# Patient Record
Sex: MALE | Race: WHITE | NOT HISPANIC OR LATINO | Employment: UNEMPLOYED | URBAN - METROPOLITAN AREA
[De-identification: names, ages, dates, MRNs, and addresses within clinical notes are randomized per-mention and may not be internally consistent; named-entity substitution may affect disease eponyms.]

---

## 2021-09-28 ENCOUNTER — HOSPITAL ENCOUNTER (INPATIENT)
Facility: MEDICAL CENTER | Age: 31
LOS: 10 days | DRG: 199 | End: 2021-10-08
Attending: EMERGENCY MEDICINE | Admitting: EMERGENCY MEDICINE
Payer: COMMERCIAL

## 2021-09-28 ENCOUNTER — APPOINTMENT (OUTPATIENT)
Dept: RADIOLOGY | Facility: MEDICAL CENTER | Age: 31
DRG: 199 | End: 2021-09-28
Attending: INTERNAL MEDICINE
Payer: COMMERCIAL

## 2021-09-28 DIAGNOSIS — J96.21 ACUTE ON CHRONIC RESPIRATORY FAILURE WITH HYPOXIA (HCC): ICD-10-CM

## 2021-09-28 DIAGNOSIS — I82.401 ACUTE DEEP VEIN THROMBOSIS (DVT) OF RIGHT LOWER EXTREMITY, UNSPECIFIED VEIN (HCC): ICD-10-CM

## 2021-09-28 PROBLEM — D75.839 THROMBOCYTOSIS: Status: ACTIVE | Noted: 2021-09-28

## 2021-09-28 PROBLEM — E11.9 TYPE 2 DIABETES MELLITUS WITHOUT COMPLICATION, WITHOUT LONG-TERM CURRENT USE OF INSULIN (HCC): Status: ACTIVE | Noted: 2021-09-28

## 2021-09-28 PROBLEM — J96.01 ACUTE RESPIRATORY FAILURE WITH HYPOXIA (HCC): Status: ACTIVE | Noted: 2021-09-28

## 2021-09-28 PROBLEM — J98.4 PNEUMATOCELE OF LUNG: Status: ACTIVE | Noted: 2021-09-28

## 2021-09-28 PROBLEM — J12.82 PNEUMONIA DUE TO COVID-19 VIRUS: Status: ACTIVE | Noted: 2021-09-28

## 2021-09-28 PROBLEM — U07.1 PNEUMONIA DUE TO COVID-19 VIRUS: Status: ACTIVE | Noted: 2021-09-28

## 2021-09-28 PROBLEM — J93.9 BILATERAL PNEUMOTHORACES: Status: ACTIVE | Noted: 2021-09-28

## 2021-09-28 PROCEDURE — 93005 ELECTROCARDIOGRAM TRACING: CPT | Performed by: INTERNAL MEDICINE

## 2021-09-28 PROCEDURE — 700111 HCHG RX REV CODE 636 W/ 250 OVERRIDE (IP): Performed by: INTERNAL MEDICINE

## 2021-09-28 PROCEDURE — A9270 NON-COVERED ITEM OR SERVICE: HCPCS | Performed by: INTERNAL MEDICINE

## 2021-09-28 PROCEDURE — 80048 BASIC METABOLIC PNL TOTAL CA: CPT

## 2021-09-28 PROCEDURE — 87641 MR-STAPH DNA AMP PROBE: CPT

## 2021-09-28 PROCEDURE — 99291 CRITICAL CARE FIRST HOUR: CPT | Performed by: INTERNAL MEDICINE

## 2021-09-28 PROCEDURE — 700102 HCHG RX REV CODE 250 W/ 637 OVERRIDE(OP): Performed by: INTERNAL MEDICINE

## 2021-09-28 PROCEDURE — 84145 PROCALCITONIN (PCT): CPT

## 2021-09-28 PROCEDURE — 770022 HCHG ROOM/CARE - ICU (200)

## 2021-09-28 PROCEDURE — 82962 GLUCOSE BLOOD TEST: CPT

## 2021-09-28 PROCEDURE — 700105 HCHG RX REV CODE 258: Performed by: INTERNAL MEDICINE

## 2021-09-28 PROCEDURE — 71045 X-RAY EXAM CHEST 1 VIEW: CPT

## 2021-09-28 RX ORDER — ENALAPRILAT 1.25 MG/ML
1.25 INJECTION INTRAVENOUS EVERY 6 HOURS PRN
Status: DISCONTINUED | OUTPATIENT
Start: 2021-09-28 | End: 2021-09-29

## 2021-09-28 RX ORDER — BISACODYL 10 MG
10 SUPPOSITORY, RECTAL RECTAL
Status: DISCONTINUED | OUTPATIENT
Start: 2021-09-28 | End: 2021-10-08 | Stop reason: HOSPADM

## 2021-09-28 RX ORDER — OXYCODONE HYDROCHLORIDE 5 MG/1
5 TABLET ORAL
Status: DISCONTINUED | OUTPATIENT
Start: 2021-09-28 | End: 2021-09-28

## 2021-09-28 RX ORDER — PROMETHAZINE HYDROCHLORIDE 25 MG/1
12.5-25 TABLET ORAL EVERY 4 HOURS PRN
Status: DISCONTINUED | OUTPATIENT
Start: 2021-09-28 | End: 2021-10-08 | Stop reason: HOSPADM

## 2021-09-28 RX ORDER — AMOXICILLIN 250 MG
2 CAPSULE ORAL 2 TIMES DAILY
Status: DISCONTINUED | OUTPATIENT
Start: 2021-09-28 | End: 2021-10-08 | Stop reason: HOSPADM

## 2021-09-28 RX ORDER — SODIUM CHLORIDE 9 MG/ML
INJECTION, SOLUTION INTRAVENOUS CONTINUOUS
Status: DISCONTINUED | OUTPATIENT
Start: 2021-09-28 | End: 2021-09-29

## 2021-09-28 RX ORDER — PROMETHAZINE HYDROCHLORIDE 25 MG/1
12.5-25 SUPPOSITORY RECTAL EVERY 4 HOURS PRN
Status: DISCONTINUED | OUTPATIENT
Start: 2021-09-28 | End: 2021-10-08 | Stop reason: HOSPADM

## 2021-09-28 RX ORDER — OXYCODONE HYDROCHLORIDE AND ACETAMINOPHEN 5; 325 MG/1; MG/1
1-2 TABLET ORAL EVERY 4 HOURS PRN
Status: DISCONTINUED | OUTPATIENT
Start: 2021-09-28 | End: 2021-09-29

## 2021-09-28 RX ORDER — POLYETHYLENE GLYCOL 3350 17 G/17G
1 POWDER, FOR SOLUTION ORAL
Status: DISCONTINUED | OUTPATIENT
Start: 2021-09-28 | End: 2021-10-08 | Stop reason: HOSPADM

## 2021-09-28 RX ORDER — ACETAMINOPHEN 325 MG/1
650 TABLET ORAL EVERY 6 HOURS PRN
Status: DISCONTINUED | OUTPATIENT
Start: 2021-09-28 | End: 2021-10-08 | Stop reason: HOSPADM

## 2021-09-28 RX ORDER — SODIUM CHLORIDE, SODIUM LACTATE, POTASSIUM CHLORIDE, AND CALCIUM CHLORIDE .6; .31; .03; .02 G/100ML; G/100ML; G/100ML; G/100ML
500 INJECTION, SOLUTION INTRAVENOUS ONCE
Status: COMPLETED | OUTPATIENT
Start: 2021-09-29 | End: 2021-09-29

## 2021-09-28 RX ORDER — HYDROMORPHONE HYDROCHLORIDE 1 MG/ML
1 INJECTION, SOLUTION INTRAMUSCULAR; INTRAVENOUS; SUBCUTANEOUS ONCE
Status: COMPLETED | OUTPATIENT
Start: 2021-09-28 | End: 2021-09-28

## 2021-09-28 RX ORDER — OXYCODONE HYDROCHLORIDE 5 MG/1
2.5 TABLET ORAL
Status: DISCONTINUED | OUTPATIENT
Start: 2021-09-28 | End: 2021-09-29

## 2021-09-28 RX ORDER — PROCHLORPERAZINE EDISYLATE 5 MG/ML
5-10 INJECTION INTRAMUSCULAR; INTRAVENOUS EVERY 4 HOURS PRN
Status: DISCONTINUED | OUTPATIENT
Start: 2021-09-28 | End: 2021-10-08 | Stop reason: HOSPADM

## 2021-09-28 RX ORDER — DEXTROSE MONOHYDRATE 25 G/50ML
50 INJECTION, SOLUTION INTRAVENOUS
Status: DISCONTINUED | OUTPATIENT
Start: 2021-09-28 | End: 2021-10-07

## 2021-09-28 RX ORDER — CLONIDINE HYDROCHLORIDE 0.1 MG/1
0.1 TABLET ORAL EVERY 6 HOURS PRN
Status: DISCONTINUED | OUTPATIENT
Start: 2021-09-28 | End: 2021-09-29

## 2021-09-28 RX ORDER — MORPHINE SULFATE 4 MG/ML
2 INJECTION, SOLUTION INTRAMUSCULAR; INTRAVENOUS
Status: DISCONTINUED | OUTPATIENT
Start: 2021-09-28 | End: 2021-09-29

## 2021-09-28 RX ORDER — ONDANSETRON 4 MG/1
4 TABLET, ORALLY DISINTEGRATING ORAL EVERY 4 HOURS PRN
Status: DISCONTINUED | OUTPATIENT
Start: 2021-09-28 | End: 2021-10-08 | Stop reason: HOSPADM

## 2021-09-28 RX ORDER — LABETALOL HYDROCHLORIDE 5 MG/ML
10 INJECTION, SOLUTION INTRAVENOUS EVERY 4 HOURS PRN
Status: DISCONTINUED | OUTPATIENT
Start: 2021-09-28 | End: 2021-09-29

## 2021-09-28 RX ORDER — ONDANSETRON 2 MG/ML
4 INJECTION INTRAMUSCULAR; INTRAVENOUS EVERY 4 HOURS PRN
Status: DISCONTINUED | OUTPATIENT
Start: 2021-09-28 | End: 2021-10-08 | Stop reason: HOSPADM

## 2021-09-28 RX ADMIN — INSULIN HUMAN 2 UNITS: 100 INJECTION, SOLUTION PARENTERAL at 23:34

## 2021-09-28 RX ADMIN — SODIUM CHLORIDE: 9 INJECTION, SOLUTION INTRAVENOUS at 23:57

## 2021-09-28 RX ADMIN — CEFEPIME 2 G: 2 INJECTION, POWDER, FOR SOLUTION INTRAVENOUS at 23:59

## 2021-09-28 RX ADMIN — OXYCODONE 5 MG: 5 TABLET ORAL at 23:03

## 2021-09-28 RX ADMIN — HYDROMORPHONE HYDROCHLORIDE 1 MG: 1 INJECTION, SOLUTION INTRAMUSCULAR; INTRAVENOUS; SUBCUTANEOUS at 22:00

## 2021-09-28 ASSESSMENT — ENCOUNTER SYMPTOMS
ABDOMINAL PAIN: 0
BRUISES/BLEEDS EASILY: 0
SPEECH CHANGE: 0
FEVER: 0
MYALGIAS: 0
VOMITING: 0
DIZZINESS: 0
HEADACHES: 0
DEPRESSION: 0
DIAPHORESIS: 1
HEMOPTYSIS: 1
SPUTUM PRODUCTION: 1
BACK PAIN: 0
PALPITATIONS: 1
SORE THROAT: 0
NAUSEA: 0
NERVOUS/ANXIOUS: 1
SENSORY CHANGE: 0
CHILLS: 0
SHORTNESS OF BREATH: 1
COUGH: 1
WHEEZING: 0
BLURRED VISION: 0

## 2021-09-28 ASSESSMENT — PAIN DESCRIPTION - PAIN TYPE
TYPE: SURGICAL PAIN
TYPE: SURGICAL PAIN

## 2021-09-29 ENCOUNTER — HOSPITAL ENCOUNTER (OUTPATIENT)
Dept: RADIOLOGY | Facility: MEDICAL CENTER | Age: 31
End: 2021-09-29

## 2021-09-29 ENCOUNTER — APPOINTMENT (OUTPATIENT)
Dept: RADIOLOGY | Facility: MEDICAL CENTER | Age: 31
DRG: 199 | End: 2021-09-29
Attending: INTERNAL MEDICINE
Payer: COMMERCIAL

## 2021-09-29 ENCOUNTER — APPOINTMENT (OUTPATIENT)
Dept: RADIOLOGY | Facility: MEDICAL CENTER | Age: 31
DRG: 199 | End: 2021-09-29
Attending: STUDENT IN AN ORGANIZED HEALTH CARE EDUCATION/TRAINING PROGRAM
Payer: COMMERCIAL

## 2021-09-29 LAB
ANION GAP SERPL CALC-SCNC: 11 MMOL/L (ref 7–16)
ANION GAP SERPL CALC-SCNC: 15 MMOL/L (ref 7–16)
BASOPHILS # BLD AUTO: 0.1 % (ref 0–1.8)
BASOPHILS # BLD: 0.03 K/UL (ref 0–0.12)
BUN SERPL-MCNC: 21 MG/DL (ref 8–22)
BUN SERPL-MCNC: 22 MG/DL (ref 8–22)
CALCIUM SERPL-MCNC: 8.8 MG/DL (ref 8.5–10.5)
CALCIUM SERPL-MCNC: 8.8 MG/DL (ref 8.5–10.5)
CHLORIDE SERPL-SCNC: 97 MMOL/L (ref 96–112)
CHLORIDE SERPL-SCNC: 97 MMOL/L (ref 96–112)
CO2 SERPL-SCNC: 21 MMOL/L (ref 20–33)
CO2 SERPL-SCNC: 24 MMOL/L (ref 20–33)
CREAT SERPL-MCNC: 0.82 MG/DL (ref 0.5–1.4)
CREAT SERPL-MCNC: 1.09 MG/DL (ref 0.5–1.4)
EKG IMPRESSION: NORMAL
EKG IMPRESSION: NORMAL
EOSINOPHIL # BLD AUTO: 0.01 K/UL (ref 0–0.51)
EOSINOPHIL NFR BLD: 0 % (ref 0–6.9)
ERYTHROCYTE [DISTWIDTH] IN BLOOD BY AUTOMATED COUNT: 46.9 FL (ref 35.9–50)
GLUCOSE BLD-MCNC: 142 MG/DL (ref 65–99)
GLUCOSE BLD-MCNC: 153 MG/DL (ref 65–99)
GLUCOSE BLD-MCNC: 166 MG/DL (ref 65–99)
GLUCOSE BLD-MCNC: 168 MG/DL (ref 65–99)
GLUCOSE SERPL-MCNC: 176 MG/DL (ref 65–99)
GLUCOSE SERPL-MCNC: 192 MG/DL (ref 65–99)
HCT VFR BLD AUTO: 34.5 % (ref 42–52)
HGB BLD-MCNC: 11.1 G/DL (ref 14–18)
IMM GRANULOCYTES # BLD AUTO: 0.33 K/UL (ref 0–0.11)
IMM GRANULOCYTES NFR BLD AUTO: 1.4 % (ref 0–0.9)
LYMPHOCYTES # BLD AUTO: 1.81 K/UL (ref 1–4.8)
LYMPHOCYTES NFR BLD: 7.9 % (ref 22–41)
MAGNESIUM SERPL-MCNC: 1.9 MG/DL (ref 1.5–2.5)
MCH RBC QN AUTO: 29.3 PG (ref 27–33)
MCHC RBC AUTO-ENTMCNC: 32.2 G/DL (ref 33.7–35.3)
MCV RBC AUTO: 91 FL (ref 81.4–97.8)
MONOCYTES # BLD AUTO: 1.99 K/UL (ref 0–0.85)
MONOCYTES NFR BLD AUTO: 8.7 % (ref 0–13.4)
MRSA DNA SPEC QL NAA+PROBE: NORMAL
NEUTROPHILS # BLD AUTO: 18.76 K/UL (ref 1.82–7.42)
NEUTROPHILS NFR BLD: 81.9 % (ref 44–72)
NRBC # BLD AUTO: 0 K/UL
NRBC BLD-RTO: 0 /100 WBC
PHOSPHATE SERPL-MCNC: 4 MG/DL (ref 2.5–4.5)
PLATELET # BLD AUTO: 480 K/UL (ref 164–446)
PMV BLD AUTO: 9.2 FL (ref 9–12.9)
POTASSIUM SERPL-SCNC: 4.9 MMOL/L (ref 3.6–5.5)
POTASSIUM SERPL-SCNC: 5.1 MMOL/L (ref 3.6–5.5)
PROCALCITONIN SERPL-MCNC: 1.4 NG/ML
RBC # BLD AUTO: 3.79 M/UL (ref 4.7–6.1)
SIGNIFICANT IND 70042: NORMAL
SITE SITE: NORMAL
SODIUM SERPL-SCNC: 132 MMOL/L (ref 135–145)
SODIUM SERPL-SCNC: 133 MMOL/L (ref 135–145)
SOURCE SOURCE: NORMAL
WBC # BLD AUTO: 22.9 K/UL (ref 4.8–10.8)

## 2021-09-29 PROCEDURE — 93010 ELECTROCARDIOGRAM REPORT: CPT | Performed by: INTERNAL MEDICINE

## 2021-09-29 PROCEDURE — 99291 CRITICAL CARE FIRST HOUR: CPT | Performed by: EMERGENCY MEDICINE

## 2021-09-29 PROCEDURE — 93010 ELECTROCARDIOGRAM REPORT: CPT | Mod: 76 | Performed by: INTERNAL MEDICINE

## 2021-09-29 PROCEDURE — 71045 X-RAY EXAM CHEST 1 VIEW: CPT

## 2021-09-29 PROCEDURE — 84100 ASSAY OF PHOSPHORUS: CPT

## 2021-09-29 PROCEDURE — 700105 HCHG RX REV CODE 258: Performed by: INTERNAL MEDICINE

## 2021-09-29 PROCEDURE — 71260 CT THORAX DX C+: CPT

## 2021-09-29 PROCEDURE — 700117 HCHG RX CONTRAST REV CODE 255: Performed by: STUDENT IN AN ORGANIZED HEALTH CARE EDUCATION/TRAINING PROGRAM

## 2021-09-29 PROCEDURE — 85025 COMPLETE CBC W/AUTO DIFF WBC: CPT

## 2021-09-29 PROCEDURE — 770022 HCHG ROOM/CARE - ICU (200)

## 2021-09-29 PROCEDURE — 700111 HCHG RX REV CODE 636 W/ 250 OVERRIDE (IP): Performed by: INTERNAL MEDICINE

## 2021-09-29 PROCEDURE — 700102 HCHG RX REV CODE 250 W/ 637 OVERRIDE(OP): Performed by: INTERNAL MEDICINE

## 2021-09-29 PROCEDURE — 700111 HCHG RX REV CODE 636 W/ 250 OVERRIDE (IP): Performed by: EMERGENCY MEDICINE

## 2021-09-29 PROCEDURE — 700111 HCHG RX REV CODE 636 W/ 250 OVERRIDE (IP): Performed by: STUDENT IN AN ORGANIZED HEALTH CARE EDUCATION/TRAINING PROGRAM

## 2021-09-29 PROCEDURE — 83735 ASSAY OF MAGNESIUM: CPT

## 2021-09-29 PROCEDURE — 700105 HCHG RX REV CODE 258: Performed by: EMERGENCY MEDICINE

## 2021-09-29 PROCEDURE — 80048 BASIC METABOLIC PNL TOTAL CA: CPT

## 2021-09-29 PROCEDURE — 700101 HCHG RX REV CODE 250: Performed by: STUDENT IN AN ORGANIZED HEALTH CARE EDUCATION/TRAINING PROGRAM

## 2021-09-29 PROCEDURE — 93005 ELECTROCARDIOGRAM TRACING: CPT | Performed by: STUDENT IN AN ORGANIZED HEALTH CARE EDUCATION/TRAINING PROGRAM

## 2021-09-29 PROCEDURE — 82962 GLUCOSE BLOOD TEST: CPT | Mod: 91

## 2021-09-29 PROCEDURE — 302129 PCA PLUS: Performed by: EMERGENCY MEDICINE

## 2021-09-29 PROCEDURE — A9270 NON-COVERED ITEM OR SERVICE: HCPCS | Performed by: INTERNAL MEDICINE

## 2021-09-29 RX ORDER — LIDOCAINE HYDROCHLORIDE 10 MG/ML
INJECTION, SOLUTION INFILTRATION; PERINEURAL
Status: ACTIVE
Start: 2021-09-29 | End: 2021-09-30

## 2021-09-29 RX ORDER — MAGNESIUM SULFATE HEPTAHYDRATE 40 MG/ML
2 INJECTION, SOLUTION INTRAVENOUS ONCE
Status: COMPLETED | OUTPATIENT
Start: 2021-09-29 | End: 2021-09-29

## 2021-09-29 RX ORDER — TETRACAINE HCL 10 MG/ML
4 INJECTION SUBARACHNOID ONCE
Status: COMPLETED | OUTPATIENT
Start: 2021-09-29 | End: 2021-09-29

## 2021-09-29 RX ORDER — MORPHINE SULFATE 4 MG/ML
4 INJECTION, SOLUTION INTRAMUSCULAR; INTRAVENOUS ONCE
Status: COMPLETED | OUTPATIENT
Start: 2021-09-29 | End: 2021-09-29

## 2021-09-29 RX ADMIN — CEFEPIME 2 G: 2 INJECTION, POWDER, FOR SOLUTION INTRAVENOUS at 17:33

## 2021-09-29 RX ADMIN — MORPHINE SULFATE: 50 INJECTION, SOLUTION, CONCENTRATE INTRAVENOUS at 12:44

## 2021-09-29 RX ADMIN — VANCOMYCIN HYDROCHLORIDE 2000 MG: 500 INJECTION, POWDER, LYOPHILIZED, FOR SOLUTION INTRAVENOUS at 03:00

## 2021-09-29 RX ADMIN — OXYCODONE AND ACETAMINOPHEN 2 TABLET: 5; 325 TABLET ORAL at 06:18

## 2021-09-29 RX ADMIN — MORPHINE SULFATE 2 MG: 4 INJECTION INTRAVENOUS at 04:05

## 2021-09-29 RX ADMIN — INSULIN HUMAN 2 UNITS: 100 INJECTION, SOLUTION PARENTERAL at 17:39

## 2021-09-29 RX ADMIN — MORPHINE SULFATE 2 MG: 4 INJECTION INTRAVENOUS at 00:54

## 2021-09-29 RX ADMIN — MAGNESIUM SULFATE 2 G: 2 INJECTION INTRAVENOUS at 10:30

## 2021-09-29 RX ADMIN — DOCUSATE SODIUM 50 MG AND SENNOSIDES 8.6 MG 2 TABLET: 8.6; 5 TABLET, FILM COATED ORAL at 05:20

## 2021-09-29 RX ADMIN — BENZOCAINE AND MENTHOL 1 LOZENGE: 15; 3.6 LOZENGE ORAL at 08:36

## 2021-09-29 RX ADMIN — DOCUSATE SODIUM 50 MG AND SENNOSIDES 8.6 MG 2 TABLET: 8.6; 5 TABLET, FILM COATED ORAL at 17:33

## 2021-09-29 RX ADMIN — INSULIN HUMAN 2 UNITS: 100 INJECTION, SOLUTION PARENTERAL at 05:21

## 2021-09-29 RX ADMIN — MORPHINE SULFATE 2 MG: 4 INJECTION INTRAVENOUS at 08:50

## 2021-09-29 RX ADMIN — SODIUM CHLORIDE, POTASSIUM CHLORIDE, SODIUM LACTATE AND CALCIUM CHLORIDE 500 ML: 600; 310; 30; 20 INJECTION, SOLUTION INTRAVENOUS at 00:35

## 2021-09-29 RX ADMIN — VANCOMYCIN HYDROCHLORIDE 2000 MG: 500 INJECTION, POWDER, LYOPHILIZED, FOR SOLUTION INTRAVENOUS at 12:46

## 2021-09-29 RX ADMIN — MORPHINE SULFATE 4 MG: 4 INJECTION INTRAVENOUS at 21:56

## 2021-09-29 RX ADMIN — GUAIFENESIN 200 MG: 100 SOLUTION ORAL at 00:32

## 2021-09-29 RX ADMIN — OXYCODONE AND ACETAMINOPHEN 2 TABLET: 5; 325 TABLET ORAL at 10:30

## 2021-09-29 RX ADMIN — ACETAMINOPHEN 650 MG: 325 TABLET, FILM COATED ORAL at 05:48

## 2021-09-29 RX ADMIN — CEFEPIME 2 G: 2 INJECTION, POWDER, FOR SOLUTION INTRAVENOUS at 05:21

## 2021-09-29 RX ADMIN — DOCUSATE SODIUM 50 MG AND SENNOSIDES 8.6 MG 2 TABLET: 8.6; 5 TABLET, FILM COATED ORAL at 02:59

## 2021-09-29 RX ADMIN — BENZOCAINE AND MENTHOL 1 LOZENGE: 15; 3.6 LOZENGE ORAL at 02:59

## 2021-09-29 RX ADMIN — SODIUM CHLORIDE: 9 INJECTION, SOLUTION INTRAVENOUS at 10:36

## 2021-09-29 RX ADMIN — ONDANSETRON 4 MG: 2 INJECTION INTRAMUSCULAR; INTRAVENOUS at 00:13

## 2021-09-29 RX ADMIN — GUAIFENESIN 200 MG: 100 SOLUTION ORAL at 10:30

## 2021-09-29 RX ADMIN — POLYETHYLENE GLYCOL 3350 1 PACKET: 17 POWDER, FOR SOLUTION ORAL at 17:33

## 2021-09-29 RX ADMIN — ONDANSETRON 4 MG: 2 INJECTION INTRAMUSCULAR; INTRAVENOUS at 21:46

## 2021-09-29 RX ADMIN — OXYCODONE AND ACETAMINOPHEN 2 TABLET: 5; 325 TABLET ORAL at 01:53

## 2021-09-29 RX ADMIN — TETRACAINE HCL INJECTION 2 ML: 10 INJECTION SUBARACHNOID at 21:06

## 2021-09-29 RX ADMIN — IOHEXOL 80 ML: 350 INJECTION, SOLUTION INTRAVENOUS at 16:07

## 2021-09-29 RX ADMIN — GUAIFENESIN 200 MG: 100 SOLUTION ORAL at 05:20

## 2021-09-29 ASSESSMENT — PATIENT HEALTH QUESTIONNAIRE - PHQ9
9. THOUGHTS THAT YOU WOULD BE BETTER OFF DEAD, OR OF HURTING YOURSELF: NOT AT ALL
6. FEELING BAD ABOUT YOURSELF - OR THAT YOU ARE A FAILURE OR HAVE LET YOURSELF OR YOUR FAMILY DOWN: SEVERAL DAYS
7. TROUBLE CONCENTRATING ON THINGS, SUCH AS READING THE NEWSPAPER OR WATCHING TELEVISION: SEVERAL DAYS
SUM OF ALL RESPONSES TO PHQ QUESTIONS 1-9: 8
8. MOVING OR SPEAKING SO SLOWLY THAT OTHER PEOPLE COULD HAVE NOTICED. OR THE OPPOSITE, BEING SO FIGETY OR RESTLESS THAT YOU HAVE BEEN MOVING AROUND A LOT MORE THAN USUAL: SEVERAL DAYS
3. TROUBLE FALLING OR STAYING ASLEEP OR SLEEPING TOO MUCH: SEVERAL DAYS
8. MOVING OR SPEAKING SO SLOWLY THAT OTHER PEOPLE COULD HAVE NOTICED. OR THE OPPOSITE, BEING SO FIGETY OR RESTLESS THAT YOU HAVE BEEN MOVING AROUND A LOT MORE THAN USUAL: SEVERAL DAYS
SUM OF ALL RESPONSES TO PHQ9 QUESTIONS 1 AND 2: 2
5. POOR APPETITE OR OVEREATING: SEVERAL DAYS
1. LITTLE INTEREST OR PLEASURE IN DOING THINGS: SEVERAL DAYS
5. POOR APPETITE OR OVEREATING: SEVERAL DAYS
6. FEELING BAD ABOUT YOURSELF - OR THAT YOU ARE A FAILURE OR HAVE LET YOURSELF OR YOUR FAMILY DOWN: SEVERAL DAYS
9. THOUGHTS THAT YOU WOULD BE BETTER OFF DEAD, OR OF HURTING YOURSELF: SEVERAL DAYS
4. FEELING TIRED OR HAVING LITTLE ENERGY: SEVERAL DAYS
7. TROUBLE CONCENTRATING ON THINGS, SUCH AS READING THE NEWSPAPER OR WATCHING TELEVISION: SEVERAL DAYS
2. FEELING DOWN, DEPRESSED, IRRITABLE, OR HOPELESS: SEVERAL DAYS
3. TROUBLE FALLING OR STAYING ASLEEP OR SLEEPING TOO MUCH: SEVERAL DAYS
4. FEELING TIRED OR HAVING LITTLE ENERGY: SEVERAL DAYS

## 2021-09-29 ASSESSMENT — COGNITIVE AND FUNCTIONAL STATUS - GENERAL
TURNING FROM BACK TO SIDE WHILE IN FLAT BAD: A LITTLE
SUGGESTED CMS G CODE MODIFIER MOBILITY: CK
WALKING IN HOSPITAL ROOM: A LITTLE
HELP NEEDED FOR BATHING: A LOT
DRESSING REGULAR UPPER BODY CLOTHING: A LOT
SUGGESTED CMS G CODE MODIFIER DAILY ACTIVITY: CK
DRESSING REGULAR LOWER BODY CLOTHING: A LOT
MOVING FROM LYING ON BACK TO SITTING ON SIDE OF FLAT BED: A LITTLE
DAILY ACTIVITIY SCORE: 15
TOILETING: A LITTLE
STANDING UP FROM CHAIR USING ARMS: A LITTLE
MOBILITY SCORE: 19
PERSONAL GROOMING: A LOT
CLIMB 3 TO 5 STEPS WITH RAILING: A LITTLE

## 2021-09-29 ASSESSMENT — PAIN DESCRIPTION - PAIN TYPE
TYPE: ACUTE PAIN
TYPE: SURGICAL PAIN
TYPE: ACUTE PAIN
TYPE: SURGICAL PAIN
TYPE: SURGICAL PAIN
TYPE: ACUTE PAIN
TYPE: ACUTE PAIN
TYPE: SURGICAL PAIN
TYPE: SURGICAL PAIN
TYPE: ACUTE PAIN

## 2021-09-29 ASSESSMENT — COPD QUESTIONNAIRES
HAVE YOU SMOKED AT LEAST 100 CIGARETTES IN YOUR ENTIRE LIFE: NO/DON'T KNOW
COPD SCREENING SCORE: 0
DO YOU EVER COUGH UP ANY MUCUS OR PHLEGM?: NO/ONLY WITH OCCASIONAL COLDS OR INFECTIONS
DURING THE PAST 4 WEEKS HOW MUCH DID YOU FEEL SHORT OF BREATH: NONE/LITTLE OF THE TIME

## 2021-09-29 ASSESSMENT — LIFESTYLE VARIABLES
EVER HAD A DRINK FIRST THING IN THE MORNING TO STEADY YOUR NERVES TO GET RID OF A HANGOVER: NO
HAVE YOU EVER FELT YOU SHOULD CUT DOWN ON YOUR DRINKING: NO
AVERAGE NUMBER OF DAYS PER WEEK YOU HAVE A DRINK CONTAINING ALCOHOL: 0
CONSUMPTION TOTAL: NEGATIVE
EVER FELT BAD OR GUILTY ABOUT YOUR DRINKING: NO
TOTAL SCORE: 0
TOTAL SCORE: 0
HOW MANY TIMES IN THE PAST YEAR HAVE YOU HAD 5 OR MORE DRINKS IN A DAY: 0
DOES PATIENT WANT TO STOP DRINKING: NO
TOTAL SCORE: 0
ALCOHOL_USE: YES
ON A TYPICAL DAY WHEN YOU DRINK ALCOHOL HOW MANY DRINKS DO YOU HAVE: 1
HAVE PEOPLE ANNOYED YOU BY CRITICIZING YOUR DRINKING: NO

## 2021-09-29 ASSESSMENT — ENCOUNTER SYMPTOMS
SORE THROAT: 0
FEVER: 0
PHOTOPHOBIA: 0
HEADACHES: 0
ABDOMINAL PAIN: 0
CHILLS: 1
SHORTNESS OF BREATH: 1
COUGH: 1

## 2021-09-29 NOTE — CARE PLAN
"The patient is Watcher - Medium risk of patient condition declining or worsening    Shift Goals  Clinical Goals: Mobilize to EOB, maintain SaO2 > 95%  Patient Goals: \"breathe better\"  Family Goals: Updated contacts in computer    Progress made toward(s) clinical / shift goals:  Mobilized to EOB, plan to bring in recliner as pt can tolerate,     Patient is not progressing towards the following goals:      Problem: Knowledge Deficit - Standard  Goal: Patient and family/care givers will demonstrate understanding of plan of care, disease process/condition, diagnostic tests and medications  Outcome: Progressing       "

## 2021-09-29 NOTE — ASSESSMENT & PLAN NOTE
Secondary to COVID-19  S/P bilateral chest tubes placed at outside hospital (right 9/26, left 9/28)  Place bilateral chest tubes to waterseal  Monitor output, leak  Treat pain w/ morphine PCA  Thoracic surgery on board  Daily chest x-ray

## 2021-09-29 NOTE — PROGRESS NOTES
Dr. Montes at bedside to assess patient.   He will order CT-scan of chest. Take patient with chest tubes to suction.

## 2021-09-29 NOTE — CONSULTS
Thoracic surgery consultation    Date: 9/29/2021    Requesting Physician: Dr. Martinez  PCP: No primary care provider on file.  Attending Physician: Peewee Montes M.D.    CC: Covid pneumonia, bilateral lung consolidation and history of pneumothorax requiring chest tube bilaterally    HPI: This is a 31 y.o. male who presented in transfer from an outside facility for bilateral Covid pneumonia with bilateral pneumothorax requiring tube thoracostomy.  He was transferred to this facility for higher level of care.  He is currently on oxygen mask, but is hemodynamically stable.  Reportedly he had a small amount of hemorrhagic fluid that was removed.    Past medical history:  1.  Exercise-induced asthma    Past surgical history: None    Current Facility-Administered Medications   Medication Dose Route Frequency Provider Last Rate Last Admin   • vancomycin (VANCOCIN) 2,000 mg in  mL IVPB  2,000 mg Intravenous Q12HR Jeremy M Gonda, M.D. 250 mL/hr at 09/29/21 1246 2,000 mg at 09/29/21 1246   • morphine PCA 1 mg/mL in 50 mL (PCA)   Intravenous Continuous Álvaro Martinez M.D.   New Bag at 09/29/21 1244    And   • Pharmacy Consult Request ...Pain Management Review 1 Each  1 Each Other PHARMACY TO DOSE Álvaro Martinez M.D.       • senna-docusate (PERICOLACE or SENOKOT S) 8.6-50 MG per tablet 2 Tablet  2 Tablet Oral BID Jeremy M Gonda, M.D.   2 Tablet at 09/29/21 0520    And   • polyethylene glycol/lytes (MIRALAX) PACKET 1 Packet  1 Packet Oral QDAY PRN Jeremy M Gonda, M.D.        And   • magnesium hydroxide (MILK OF MAGNESIA) suspension 30 mL  30 mL Oral QDAY PRN Jeremy M Gonda, M.D.        And   • bisacodyl (DULCOLAX) suppository 10 mg  10 mg Rectal QDAY PRN Jeremy M Gonda, M.D.       • Respiratory Therapy Consult   Nebulization Continuous RT Jeremy M Gonda, M.D.       • acetaminophen (Tylenol) tablet 650 mg  650 mg Oral Q6HRS PRN Jeremy M Gonda, M.D.   650 mg at 09/29/21 0548   • ondansetron (ZOFRAN) syringe/vial  injection 4 mg  4 mg Intravenous Q4HRS PRN Jeremy M Gonda, M.D.   4 mg at 09/29/21 0013   • ondansetron (ZOFRAN ODT) dispertab 4 mg  4 mg Oral Q4HRS PRN Jeremy M Gonda, M.D.       • promethazine (PHENERGAN) tablet 12.5-25 mg  12.5-25 mg Oral Q4HRS PRN Jeremy M Gonda, M.D.       • promethazine (PHENERGAN) suppository 12.5-25 mg  12.5-25 mg Rectal Q4HRS PRN Jeremy M Gonda, M.D.       • prochlorperazine (COMPAZINE) injection 5-10 mg  5-10 mg Intravenous Q4HRS PRN Jeremy M Gonda, M.D.       • insulin regular (HumuLIN R,NovoLIN R) injection  2-9 Units Subcutaneous Q6HRS Jeremy M Gonda, M.D.   2 Units at 09/29/21 0521    And   • glucose 4 g chewable tablet 16 g  16 g Oral Q15 MIN PRN Jeremy M Gonda, M.D.        And   • dextrose 50% (D50W) injection 50 mL  50 mL Intravenous Q15 MIN PRN Jeremy M Gonda, M.D.       • cefepime (Maxipime) 2 g in  mL IVPB  2 g Intravenous Q12HRS Jeremy M Gonda, M.D.   Stopped at 09/29/21 0551   • MD Alert...Vancomycin per Pharmacy   Other PHARMACY TO DOSE Jeremy M Gonda, M.D.       • benzocaine-menthol (CEPACOL) lozenge 1 Lozenge  1 Lozenge Mouth/Throat Q2HRS PRN Jeremy M Gonda, M.D.   1 Lozenge at 09/29/21 0836   • guaiFENesin (ROBITUSSIN) 100 MG/5ML solution 200 mg  10 mL Oral Q4HRS PRN Jeremy M Gonda, M.D.   200 mg at 09/29/21 1030       Social History     Socioeconomic History   • Marital status: Single     Spouse name: Not on file   • Number of children: Not on file   • Years of education: Not on file   • Highest education level: Not on file   Occupational History   • Not on file   Tobacco Use   • Smoking status: Never Smoker   • Smokeless tobacco: Never Used   Vaping Use   • Vaping Use: Never used   Substance and Sexual Activity   • Alcohol use: Yes     Alcohol/week: 1.2 oz     Types: 2 Shots of liquor per week     Comment: not even every month, very seldom (maybe one shot every 12 months)   • Drug use: Not Currently   • Sexual activity: Not on file   Other Topics Concern   • Not on  "file   Social History Narrative   • Not on file     Social Determinants of Health     Financial Resource Strain:    • Difficulty of Paying Living Expenses:    Food Insecurity:    • Worried About Running Out of Food in the Last Year:    • Ran Out of Food in the Last Year:    Transportation Needs:    • Lack of Transportation (Medical):    • Lack of Transportation (Non-Medical):    Physical Activity:    • Days of Exercise per Week:    • Minutes of Exercise per Session:    Stress:    • Feeling of Stress :    Social Connections:    • Frequency of Communication with Friends and Family:    • Frequency of Social Gatherings with Friends and Family:    • Attends Orthodoxy Services:    • Active Member of Clubs or Organizations:    • Attends Club or Organization Meetings:    • Marital Status:    Intimate Partner Violence:    • Fear of Current or Ex-Partner:    • Emotionally Abused:    • Physically Abused:    • Sexually Abused:        History reviewed. No pertinent family history.    Allergies:  Lidocaine    Review of Systems:  Negative except as noted above in the HPI and 10 point review    Physical Exam:  /64   Pulse (!) 111   Temp 36.1 °C (97 °F) (Temporal)   Resp (!) 33   Ht 1.905 m (6' 3\")   Wt (!) 126 kg (277 lb 12.5 oz)   SpO2 98%     Constitutional: he is oriented to person, place, and time.  he appears well-developed and well-nourished. No acute distress.   Head: Normocephalic and atraumatic.   Neck: Normal range of motion. Neck supple. No JVD present. No tracheal deviation present.   Cardiovascular: Normal rate, regular rhythm, normal heart sounds and intact distal pulses.  Exam reveals no gallop and no friction rub.  No murmur heard.  Pulmonary/Chest: Breathing is not significantly labored at rest on oxygen mask.  No stridor or respiratory distress.  Bilateral chest tubes to Pleur-evac.  No air leak noted on -20 mmHg suction  Abdominal: Soft, nontender, nondistended.   Musculoskeletal: Normal range of " motion. he exhibits no edema and no tenderness.   Neurological: he is alert and oriented to person, place, and time.  No gross deficits noted  Skin: Skin is warm and dry. No rash noted. he is not diaphoretic. No erythema. No pallor.   Psychiatric: he has a normal mood and affect.  Behavior is reasonable under the circumstances.       Labs:  Recent Labs     09/29/21  0500   WBC 22.9*   RBC 3.79*   HEMOGLOBIN 11.1*   HEMATOCRIT 34.5*   MCV 91.0   MCH 29.3   MCHC 32.2*   RDW 46.9   PLATELETCT 480*   MPV 9.2     Recent Labs     09/28/21  2340 09/29/21  0500   SODIUM 133* 132*   POTASSIUM 4.9 5.1   CHLORIDE 97 97   CO2 21 24   GLUCOSE 192* 176*   BUN 22 21   CREATININE 1.09 0.82   CALCIUM 8.8 8.8               Radiology:  OUTSIDE IMAGES-US VASCULAR   Final Result      OUTSIDE IMAGES-DX CHEST   Final Result      CT-FOREIGN FILM CAT SCAN   Final Result      OUTSIDE IMAGES-DX CHEST   Final Result      OUTSIDE IMAGES-DX CHEST   Final Result      OUTSIDE IMAGES-DX CHEST   Final Result      OUTSIDE IMAGES-DX CHEST   Final Result      OUTSIDE IMAGES-DX CHEST   Final Result      OUTSIDE IMAGES-DX CHEST   Final Result      CT-FOREIGN FILM CAT SCAN   Final Result      DX-CHEST-LIMITED (1 VIEW)   Final Result         No significant interval change. Small left apical pneumothorax.      DX-CHEST-PORTABLE (1 VIEW)   Final Result         Left apical pneumothorax is seen. There is a bulging elliptical opacity in the left hemithorax which could relate to loculated fluid or hematoma.      CRITICAL RESULT READ BACK: Preliminary findings discussed with and critical read back performed by Dr. RADHA TAPIA JR via telephone on 9/28/2021 10:58 PM      DX-CHEST-LIMITED (1 VIEW)    (Results Pending)       Assessment: This is a 31 y.o. male with bilateral Covid pneumonia and bilateral spontaneous pneumothorax.  Severe bilateral lung consolidation, right worse than left on outside imaging.  Hemodynamically stable.  No air leaks noted  bilaterally      Recommendations:   -Continue bilateral chest tubes to -20 suction for now  -Recommend CT chest with contrast to reevaluate bilateral pleural spaces and parenchyma.  This can be ordered by primary service  -Pending imaging findings, will likely place bilateral chest tubes to waterseal and evaluate response.  -No indication for surgical intervention at this time.  Recommend continued aggressive supportive medical therapy  -Call with questions/concerns/updates      Peewee Montes M.D.  [facility], thoracic surgery  [phone]

## 2021-09-29 NOTE — PLAN OF CARE (IOPOC)
[facility] TRIAGE OFFICER DIRECT ADMISSION REPORT  Transferring facility: [place], [place]  Transferring physician: Dr Braxton  Transferring facility/physician contact number:   Chief complaint: COVID PNA w/ bilateral pneumothoraxes  Pertinent history & patient course: 31M COVID PNA w/ R pneumothorax and L hemopneumothorax w/ pneumatocele, getting bilateral chest tubes prior to transfer  Pertinent imaging & lab results: Chest CT w/ bilateral PTX, right to left shift  Code Status: Full code per transferring provider, I personally verified with the transferring provider patient's code status and the transferring provider has confirmed this with the patient.  Further work up or recommendations per triage officer prior to transfer: place left chest tube prior to transfer  Consultants called prior to transfer and pertinent input from consultants: general vs CT surgery  Patient accepted for transfer: Yes  Consultants to be called upon arrival: general vs CT surgery  Admission status: Inpatient.   Floor requested: ICU  If ICU transfer, name of intensivist case discussed with and pertinent input from critical care: [facility]    Please inform the triage officer upon arrival of the patient to [facility] for assignment of a hospitalist to perform admission.     For any question or concerns regarding the care of this patient, please reach out to the assigned hospitalist.

## 2021-09-29 NOTE — HOSPITAL COURSE
"Chief Complaint  COVID-19 pneumonia with bilateral pneumothorax/pneumatoceles     \"31 y.o. male who was transferred from [facility] in [facility] to [facility] on 9/28/2021 with a past medical history significant for diet-controlled diabetes, hypertension and hyperlipidemia who is unvaccinated against Covid and unfortunately developed a COVID-19 viral pneumonia dating back to August 30.  He was seen in the ED and/or hospitalized on 5 separate occasions between diagnosis and today for various episodes of shortness of breath.  Most recently, he was admitted from September 22-25 and was discharged on Xarelto and 3 L/min nasal cannula.  On 26 September, patient was turning to his side at home when he developed sudden onset of bilateral severe chest pain with shortness of breath and his oxygen monitor at home dropped to 36%.  He was brought back into the emergency department where he was found to have a large right-sided pneumothorax and a small left-sided loculated pneumothorax.  General surgery placed a right-sided chest tube.  Patient was placed on Remdesivir, vancomycin, cefdinir, and Decadron.  The left-sided pneumothorax was monitored but unfortunately worsened and patient continued to have hemoptysis.  A CT chest on 9/28 showed bilateral large pneumatoceles with blood attenuation material in each side as well as residual pneumothoraces left greater than right.  The general surgeon then placed a left-sided chest tube.  Given lack of thoracic surgery at [facility] where patient was admitted, he was transferred to [facility] for higher level of care.  En route, patient required several doses of narcotics for pain management and was placed on 10 L/min facemask.  Patient denies prior history of lung disease other than \"exercise induced asthma\" and works in a [employer] as a  in [place], [place].\"    Hospital Course  9/28 transfer to [facility] SICU  9/29 thoracic " surgery consultation - SIVAN Montes to see patient, chest tubes to -20 cm PleuraVac

## 2021-09-29 NOTE — ASSESSMENT & PLAN NOTE
Bilateral (left greater than right) with proteinaceous fluid consistent with most likely blood  Thoracic surgery consulted - Dr. Montes on board  Avoid anticoagulation  Monitor hemoptysis  Continue chest tube drainage  CT chest images from OSH uploaded in PACS

## 2021-09-29 NOTE — ASSESSMENT & PLAN NOTE
COVID-19 pneumonia  Dexamethasone, remdesivir and Baricitinab - at OSH  Fluid balance - keep euvolemic  Antitussives, supportive care, monitoring LFTs    Acute hypoxic respiratory failure due to COVID-19 pneumonia  Titrate to keep O2 saturation >88%  Encourage self-proning  Encourage incentive spirometry  Aggressive pulmonary hygiene  Continue empiric cefepime for possible secondary bacterial pneumonia  MRSA nares negative  Procalcitonin 1.40

## 2021-09-29 NOTE — PROGRESS NOTES
Patient arrived to [facility] from [place] at 2130 accompanied by 3 Reach members. Patient on transport monitor. ICU monitor placed. Pt ambulated from [facility] to bed. Chest tubes to water seal. Page sent out to Dr. Best regarding pt arrival.     - 50 mcg IV Fentanyl given by Reach member   /52  O2 sat 98%, 10 L mask  Temp: 96f   Weight 126 kg.      2150 . Dr. Best paged and orders received for IV Dilaudid.

## 2021-09-29 NOTE — PROGRESS NOTES
Updated COVID + test faxed from [facility]  8/26/2021 at 13:05

## 2021-09-29 NOTE — PROGRESS NOTES
Patient was unstable when admitted, in SVT and needing high oxygen requirement, does not tolerate activity. Unable to do 2 RN skin check at the time, charge RN Oriana made aware

## 2021-09-29 NOTE — PROGRESS NOTES
"Left lung sounds more diminished compared to right lungs. Patient reports pain 6/10 continuous pain mostly on left chest to mid chest with \"gurgling\" characteristic upon inhalation. 5 cc serosanguinous drainage noted in left chest tube chamber and 975 cc serosanguinous drainage with thick clots noted on right chest tube chamber.     Patient's HR now 145 after 1 mg dilaudid and changed NRB to a simple mask, patient reporting feeling less anxious and claustrophobic. Continues to have hemoptysis.  "

## 2021-09-29 NOTE — PROGRESS NOTES
"Pharmacy Vancomycin Kinetics Note for 9/29/2021     31 y.o. male on Vancomycin day # 1     Vancomycin Indication (AUC Dosing): S. aureus pneumonia    Provider specified end date: 10/03/21    Active Antibiotics (From admission, onward)    Ordered     Ordering Provider       Wed Sep 29, 2021 12:52 AM    09/29/21 0052  vancomycin (VANCOCIN) 2,000 mg in  mL IVPB  (vancomycin (VANCOCIN) IV (LD + Maintenance))  EVERY 12 HOURS         Jeremy M Gonda, M.D.       Tue Sep 28, 2021 10:28 PM    09/28/21 2228  cefepime (Maxipime) 2 g in  mL IVPB  EVERY 12 HOURS         Jeremy M Gonda, M.D.    09/28/21 2228  MD Alert...Vancomycin per Pharmacy  PHARMACY TO DOSE        Question:  Indication(s) for vancomycin?  Answer:  Pneumonia    Jeremy M Gonda, M.D.          Dosing Weight: 126 kg (277 lb 12.5 oz)      Admission History: Admitted on 9/28/2021 for pneumothorax, hemothorax, pneumomediastinum  Pertinent history: Patient transferred from outside hospital for COVID pneumonia with associated pneumothorax and pneumomediastinum. Broad spectrum antibiotics empirically iniaited at this time. MRSA nares ordered.    Allergies:     Lidocaine     Pertinent cultures to date:     Results     Procedure Component Value Units Date/Time    MRSA By PCR (Amp) [[id number]] Collected: 09/28/21 2342    Order Status: Completed Specimen: Respirate from Nares Updated: 09/29/21 0011    Narrative:      Enhanced Droplet, Contact, and Eye [id number]  [facility]          Labs:     Estimated Creatinine Clearance: 140.3 mL/min (by C-G formula based on SCr of 1.09 mg/dL).  No results for input(s): WBC, NEUTSPOLYS, BANDSSTABS in the last 72 hours.  Recent Labs     09/28/21  2340   BUN 22   CREATININE 1.09     No intake or output data in the 24 hours ending 09/29/21 0053   /73   Pulse (!) 139   Temp (!) 35.6 °C (96 °F) (Temporal)   Resp (!) 33   Ht 1.905 m (6' 3\")   Wt (!) 126 kg (277 lb 12.5 oz)   SpO2 96%  Temp (24hrs), " Av.6 °C (96 °F), Min:35.6 °C (96 °F), Max:35.6 °C (96 °F)      List concerns for Vancomycin clearance:     Obesity    Pharmacokinetics: AUC    AUC kinetics:   Ke (hr ^-1): 0.1206 hr^-1  Half life: 5.75 hr  Clearance: 7.643  Estimated TDD: 3821.5  Estimated Dose: 1162  Estimated interval: 7.3    A/P:     -  Vancomycin dose: 2000 mg q 12h    -  Next vancomycin level(s): not currently scheduled. Consider after 4th maintenance dose ( @ 1300)    -  Predicted vancomycin AUC from initial AUC test calculator: 523 mg·hr/L      -  Comments: Please continue to monitor renal function, urine output and vancomycin levels for dosing adjustments. Please also follow cultures and signs of clinical cure for de-escalation of therapy.  MRSA nares ordered to help guide therapy.    Kat Story, PharmD

## 2021-09-29 NOTE — CONSULTS
"Critical Care Consultation    Date of consult: 9/28/2021    Referring Physician  Álvaro Martinez M.D.    Reason for Consultation  COVID-19 pneumonia with bilateral pneumothorax/pneumatoceles    History of Presenting Illness  31 y.o. male who was transferred from [facility] in [facility] to [facility] on 9/28/2021 with a past medical history significant for diet-controlled diabetes, hypertension and hyperlipidemia who is unvaccinated against Covid and unfortunately developed a COVID-19 viral pneumonia dating back to August 30.  He was seen in the ED and/or hospitalized on 5 separate occasions between diagnosis and today for various episodes of shortness of breath.  Most recently, he was admitted from September 22-25 and was discharged on Xarelto and 3 L/min nasal cannula.  On 26 September, patient was turning to his side at home when he developed sudden onset of bilateral severe chest pain with shortness of breath and his oxygen monitor at home dropped to 36%.  He was brought back into the emergency department where he was found to have a large right-sided pneumothorax and a small left-sided loculated pneumothorax.  General surgery placed a right-sided chest tube.  Patient was placed on Remdesivir, vancomycin, cefdinir, and Decadron.  The left-sided pneumothorax was monitored but unfortunately worsened and patient continued to have hemoptysis.  A CT chest on 9/28 showed bilateral large pneumatoceles with blood attenuation material in each side as well as residual pneumothoraces left greater than right.  The general surgeon then placed a left-sided chest tube.  Given lack of thoracic surgery at [facility] where patient was admitted, he was transferred to [facility] for higher level of care.  En route, patient required several doses of narcotics for pain management and was placed on 10 L/min facemask.  Patient denies prior history of lung disease other than \"exercise " "induced asthma\" and works in a [email or web address] as a  in [place], [place].    Code Status  Full Code    Review of Systems  Review of Systems   Constitutional: Positive for diaphoresis and malaise/fatigue. Negative for chills and fever.   HENT: Negative for congestion and sore throat.    Eyes: Negative for blurred vision.   Respiratory: Positive for cough, hemoptysis, sputum production and shortness of breath. Negative for wheezing.    Cardiovascular: Positive for chest pain and palpitations. Negative for leg swelling.   Gastrointestinal: Negative for abdominal pain, nausea and vomiting.   Genitourinary: Negative for dysuria.   Musculoskeletal: Negative for back pain and myalgias.   Skin: Negative for rash.   Neurological: Negative for dizziness, sensory change, speech change and headaches.   Endo/Heme/Allergies: Does not bruise/bleed easily.   Psychiatric/Behavioral: Negative for depression. The patient is nervous/anxious.    All other systems reviewed and are negative.      Past Medical History   has no past medical history on file. -Diet controlled diabetes, hypertension, hyperlipidemia, exercise-induced asthma    Surgical History   has no past surgical history on file. -Bilateral chest tubes    Family History  family history is not on file. 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  He drinks occasional alcohol and denies illicit drugs.    Medications  Home Medications    **Home medications have not yet been reviewed for this encounter**       Current Facility-Administered Medications   Medication Dose Route Frequency Provider Last Rate Last Admin   • senna-docusate (PERICOLACE or SENOKOT S) 8.6-50 MG per tablet 2 Tablet  2 Tablet Oral BID Jeremy M Gonda, M.D.        And   • polyethylene glycol/lytes (MIRALAX) PACKET 1 Packet  1 Packet Oral QDAY PRN Jeremy M Gonda, M.D.        And   • magnesium hydroxide (MILK OF MAGNESIA) suspension 30 mL "  30 mL Oral QDAY PRN Jeremy M Gonda, M.D.        And   • bisacodyl (DULCOLAX) suppository 10 mg  10 mg Rectal QDAY PRN Jeremy M Gonda, M.D.       • Respiratory Therapy Consult   Nebulization Continuous RT Jeremy M Gonda, M.D.       • NS infusion   Intravenous Continuous Jeremy M Gonda, M.D.       • acetaminophen (Tylenol) tablet 650 mg  650 mg Oral Q6HRS PRN Jeremy M Gonda, M.D.       • Pharmacy Consult Request ...Pain Management Review 1 Each  1 Each Other PHARMACY TO DOSE Jeremy M Gonda, M.D.       • oxyCODONE immediate-release (ROXICODONE) tablet 2.5 mg  2.5 mg Oral Q3HRS PRN Jeremy M Gonda, M.D.        Or   • morphine (pf) 4 mg/mL injection 2 mg  2 mg Intravenous Q3HRS PRN Jeremy M Gonda, M.D.       • cloNIDine (CATAPRES) tablet 0.1 mg  0.1 mg Oral Q6HRS PRN Jeremy M Gonda, M.D.       • enalaprilat (Vasotec) injection 1.25 mg 1 mL  1.25 mg Intravenous Q6HRS PRN Jeremy M Gonda, M.D.       • labetalol (NORMODYNE/TRANDATE) injection 10 mg  10 mg Intravenous Q4HRS PRN Jeremy M Gonda, M.D.       • ondansetron (ZOFRAN) syringe/vial injection 4 mg  4 mg Intravenous Q4HRS PRN Jeremy M Gonda, M.D.       • ondansetron (ZOFRAN ODT) dispertab 4 mg  4 mg Oral Q4HRS PRN Jeremy M Gonda, M.D.       • promethazine (PHENERGAN) tablet 12.5-25 mg  12.5-25 mg Oral Q4HRS PRN Jeremy M Gonda, M.D.       • promethazine (PHENERGAN) suppository 12.5-25 mg  12.5-25 mg Rectal Q4HRS PRN Jeremy M Gonda, M.D.       • prochlorperazine (COMPAZINE) injection 5-10 mg  5-10 mg Intravenous Q4HRS PRN Jeremy M Gonda, M.D.       • [START ON 9/29/2021] insulin regular (HumuLIN R,NovoLIN R) injection  2-9 Units Subcutaneous Q6HRS Jeremy M Gonda, M.D.   2 Units at 09/28/21 2334    And   • glucose 4 g chewable tablet 16 g  16 g Oral Q15 MIN PRN Jeremy M Gonda, M.D.        And   • dextrose 50% (D50W) injection 50 mL  50 mL Intravenous Q15 MIN PRN Jeremy M Gonda, M.D.       • cefepime (Maxipime) 2 g in  mL IVPB  2 g Intravenous Q12HRS Jeremy M Gonda,  "M.D.       • MD Alert...Vancomycin per Pharmacy   Other PHARMACY TO DOSE Jeremy M Gonda, M.D.       • benzocaine-menthol (CEPACOL) lozenge 1 Lozenge  1 Lozenge Mouth/Throat Q2HRS PRN Jeremy M Gonda, M.D.       • oxyCODONE-acetaminophen (PERCOCET) 5-325 MG per tablet 1-2 Tablet  1-2 Tablet Oral Q4HRS PRN Jeremy M Gonda, M.D.       • guaiFENesin (ROBITUSSIN) 100 MG/5ML solution 200 mg  10 mL Oral Q4HRS PRN Jeremy M Gonda, M.D.           Allergies  Allergies   Allergen Reactions   • Lidocaine Rash     Patient states \"it gives me blisters where it touches\"       Vital Signs last 24 hours  Temp:  [35.6 °C (96 °F)] 35.6 °C (96 °F)  Pulse:  [156] 156  Resp:  [40] 40  BP: (113)/(79) 113/79  SpO2:  [96 %] 96 %    Physical Exam  Physical Exam  Vitals and nursing note reviewed.   Constitutional:       General: He is awake. He is not in acute distress.     Appearance: He is well-developed and overweight. He is ill-appearing and diaphoretic.      Interventions: Face mask in place.   HENT:      Head: Normocephalic and atraumatic.      Right Ear: External ear normal.      Left Ear: External ear normal.      Nose: Nose normal. No congestion.      Mouth/Throat:      Mouth: Mucous membranes are moist.      Pharynx: Oropharynx is clear. No oropharyngeal exudate.   Eyes:      General: No scleral icterus.     Conjunctiva/sclera: Conjunctivae normal.      Pupils: Pupils are equal, round, and reactive to light.   Neck:      Vascular: No JVD.   Cardiovascular:      Rate and Rhythm: Regular rhythm. Tachycardia present. Occasional extrasystoles are present.     Chest Wall: PMI is not displaced.      Pulses: Normal pulses.      Heart sounds: Heart sounds are distant. No murmur heard.     Pulmonary:      Effort: Pulmonary effort is normal. Tachypnea present. No accessory muscle usage or retractions.      Breath sounds: No stridor. Examination of the right-middle field reveals rhonchi. Examination of the left-middle field reveals rhonchi. " Examination of the right-lower field reveals rhonchi. Examination of the left-lower field reveals rhonchi. Decreased breath sounds and rhonchi present. No wheezing.      Comments: Right-sided chest tube to waterseal with 1100 mL of serosanguineous output, small air leak.  Left-sided chest tube to waterseal with 100 mL of serous output.  Small air leak.  Abdominal:      General: Bowel sounds are normal. There is no distension.      Palpations: Abdomen is soft.      Tenderness: There is no abdominal tenderness. There is no guarding.   Musculoskeletal:         General: No tenderness.      Cervical back: Neck supple.      Right lower leg: No edema.      Left lower leg: No edema.   Lymphadenopathy:      Cervical: No cervical adenopathy.   Skin:     General: Skin is warm.      Capillary Refill: Capillary refill takes less than 2 seconds.      Coloration: Skin is pale.      Findings: No rash.   Neurological:      General: No focal deficit present.      Mental Status: He is alert and oriented to person, place, and time.      Cranial Nerves: No cranial nerve deficit.      Sensory: No sensory deficit.   Psychiatric:         Mood and Affect: Mood normal.         Behavior: Behavior normal. Behavior is cooperative.         Thought Content: Thought content normal.         Fluids  No intake or output data in the 24 hours ending 21 2341    Laboratory  Recent Results (from the past 48 hour(s))   EKG    Collection Time: 21 10:29 PM   Result Value Ref Range    Report       [facility]    Test Date:  2021  Pt Name:    HELEN MOLINA                Department: 19  MRN:        [id number]                      Room:       [facility]  Gender:     Male                         Technician: JM  :        1990                   Requested By:RADHA TAPIA JR  Order #:    [id number]                    Reading MD:    Measurements  Intervals                                Axis  Rate:       158                          P:       "    40  IN:         120                          QRS:        51  QRSD:       82                           T:          -28  QT:         260  QTc:        422    Interpretive Statements  SINUS TACHYCARDIA  NONSPECIFIC T ABNORMALITIES, INFERIOR LEADS  No previous ECG available for comparison      *Reviewed most recent labs (9/28) from outside hospital including a CBC with a white count of 22,000, hemoglobin 12, platelets of 531, BMP sodium 133, potassium 3.7, chloride 98, bicarb 26, BUN 20, creatinine 0.9, glucose 155, magnesium 2.1, INR 1.05    Imaging  DX-CHEST-PORTABLE (1 VIEW)    (Results Pending)   DX-CHEST-LIMITED (1 VIEW)    (Results Pending)    *Personally reviewed chest x-ray showing diffuse bilateral infiltrates with bilateral pneumatoceles left greater than right, residual 20% left-sided pneumothorax and small apical right-sided pneumothorax with chest tubes in good position bilaterally   *EKG personally reviewed showing sinus tachycardia with a heart rate of 148, no ST elevation, normal axis and intervals    Assessment/Plan  * Pneumonia due to COVID-19 virus  Assessment & Plan  Continue with the following therapies while monitoring closely:  Steroids: Completed several rounds of Decadron, hold additional steroids for now  Remdesivir: Completed remdesivir in the past, no further benefit at this time  Monoclonal antibody: Status post baricitinib at outside hospital  Anticoagulation: Holding given hemoptysis/pneumatocele  Diuresis: Currently euvolemic, hold diuretic and monitor  Antitussives, supportive care, monitoring LFTs  Maintain strict isolation precautions per hospital guidelines, may be able to be \"cleared from Covid\" given greater than 21-day since original diagnosis, check with infection control in the morning  Encourage self proning protocols as able if oxygenation worsens  Continue empiric cefepime and vancomycin for possible secondary bacterial pneumonia, follow-up on OSH cultures/MRSA nares  Check " PCT    Pneumatocele of lung  Assessment & Plan  Bilateral left greater than right with proteinaceous fluid consistent with most likely blood  Thoracic surgery consultation in the morning  Avoid anticoagulation  Monitor hemoptysis  Continue chest tube drainage  CT chest images to be uploaded into PACS for review    Bilateral pneumothoraces  Assessment & Plan  Secondary to COVID-19 status post bilateral chest tubes placed at outside hospital (right 9/26, left 9/28)  Continue bilateral chest tubes to -20 cm of suction  Monitor output, leak  As needed analgesics  Daily chest x-ray    Acute respiratory failure with hypoxia (HCC)  Assessment & Plan  Multifactorial -pneumothoraces, pneumatoceles, COVID-19  RT/O2 protocol  Titrate oxygen support to goal SPO2 greater than 90%, attempt to avoid positive pressure ventilation given bilateral pneumothoraces  Encourage incentive spirometry, mobilization  As needed anxiolytics  Mucolytics    Type 2 diabetes mellitus without complication, without long-term current use of insulin (HCC)  Assessment & Plan  Insulin sliding scale  Diabetic diet monitoring glucose with goal between 120 and 180    Thrombocytosis  Assessment & Plan  Likely reactive  Monitor      Discussed patient condition and risk of morbidity and/or mortality with RN, RT, Charge nurse / hot rounds and Patient.    The patient remains critically ill.  Critical care time = 49 minutes in directly providing and coordinating critical care and extensive data review.  No time overlap and excludes procedures.

## 2021-09-29 NOTE — PROGRESS NOTES
Obtained more detailed medical records that will be scanned in.   Positive COVID-19 test documented 9/22/2021.     Attempted to update Karyn. Spoke with a colleague. Unable to fully [place] back with Karyn at this time.

## 2021-09-29 NOTE — INFECTION CONTROL
This patient is considered COVID RECOVERED.    Patient initially tested positive for COVID [place] 8/30/2021 [place] [facility].  Patient is greater than or equal to 21 days from symptom onset and/or positive test, with symptom improvement.  Per the CDC guidance, this patient no longer requires transmission based precautions.  Patient may be placed on any unit per the bed assignment policy, including placement in a semi-private room with a roommate.

## 2021-09-29 NOTE — ASSESSMENT & PLAN NOTE
Acute hypoxic respiratory failure due to COVID-19 pneumonia, pneumothoraces, and pneumatoceles  Titrate to keep O2 saturation >88%  Encourage self-proning  Encourage incentive spirometry  Aggressive pulmonary hygiene

## 2021-09-29 NOTE — PROGRESS NOTES
Double checked with Dr. Gonda that he was aware of extreme bubbling in chest tube chamber (water seal and tidaling area). Dressings changed and checked all possible air leak sites (chamber, chest tube, insertion site); bubbling continued. After decreasing suction to -10cm H2O, bubbling and tidaling both ceased, Dr. Gonda made aware. Keep suction at -10cm H2O per Dr. Gonda.

## 2021-09-29 NOTE — PROGRESS NOTES
4 Eyes Skin Assessment Completed by Jazmin, MICHAEL and MICHAEL Fajardo.    Head WDL  Ears WDL  Nose WDL  Mouth WDL  Neck WDL  Breast/Chest Incision - chest tube sites  Shoulder Blades WDL  Spine WDL  (R) Arm/Elbow/Hand WDL  (L) Arm/Elbow/Hand Edema  Abdomen WDL  Groin WDL  Scrotum/Coccyx/Buttocks Redness blanching  (R) Leg WDL  (L) Leg WDL  (R) Heel/Foot/Toe WDL  (L) Heel/Foot/Toe WDL          Devices In Places ECG, Blood Pressure Cuff, Pulse Ox, SCD's and Oxy Mask, bilateral chest tubes      Interventions In Place Pillows    Possible Skin Injury No    Pictures Uploaded Into Epic N/A  Wound Consult Placed N/A  RN Wound Prevention Protocol Ordered No

## 2021-09-29 NOTE — PROGRESS NOTES
Discussed downgrade of isolation precautions in IDT rounds and at this time we will obtain his COVID+ test from previous outlying facility in [place] of retesting routine COVID. Dr. Martinez also more comfortable keeping precautions at this time until further evaluation.

## 2021-09-29 NOTE — PROGRESS NOTES
1230: pt having complaints of new onset numbness in chest extending into his left arm. Pain has been present and he does say that his pain is in his chest extending through where the chest tube is. I changed the dressing and chest tube remains intact at the skin. No knew change to his vitals (-108 / /64 / O2 down to 10L oxymask)    Orders:   Stat EKG  Stat Chest x-ray

## 2021-09-29 NOTE — PROGRESS NOTES
"Critical Care Progress Note    Date of admission  9/28/2021    Chief Complaint  COVID-19 pneumonia with bilateral pneumothorax/pneumatoceles     \"31 y.o. male who was transferred from [facility] in [facility] to [facility] on 9/28/2021 with a past medical history significant for diet-controlled diabetes, hypertension and hyperlipidemia who is unvaccinated against Covid and unfortunately developed a COVID-19 viral pneumonia dating back to August 30.  He was seen in the ED and/or hospitalized on 5 separate occasions between diagnosis and today for various episodes of shortness of breath.  Most recently, he was admitted from September 22-25 and was discharged on Xarelto and 3 L/min nasal cannula.  On 26 September, patient was turning to his side at home when he developed sudden onset of bilateral severe chest pain with shortness of breath and his oxygen monitor at home dropped to 36%.  He was brought back into the emergency department where he was found to have a large right-sided pneumothorax and a small left-sided loculated pneumothorax.  General surgery placed a right-sided chest tube.  Patient was placed on Remdesivir, vancomycin, cefdinir, and Decadron.  The left-sided pneumothorax was monitored but unfortunately worsened and patient continued to have hemoptysis.  A CT chest on 9/28 showed bilateral large pneumatoceles with blood attenuation material in each side as well as residual pneumothoraces left greater than right.  The general surgeon then placed a left-sided chest tube.  Given lack of thoracic surgery at [facility] where patient was admitted, he was transferred to [facility] for higher level of care.  En route, patient required several doses of narcotics for pain management and was placed on 10 L/min facemask.  Patient denies prior history of lung disease other than \"exercise induced asthma\" and works in a [employer] as a  in [place], " "[place].\"    Hospital Course  9/28 transfer to [facility]  9/29 thoracic surgery consultation - SIVAN Montes to see patient, chest tubes to -20 cm PleuraVac      Review of Systems  Review of Systems   Constitutional: Positive for chills and malaise/fatigue. Negative for fever.   HENT: Negative for sore throat.    Eyes: Negative for photophobia.   Respiratory: Positive for cough and shortness of breath.    Cardiovascular: Negative for chest pain.   Gastrointestinal: Negative for abdominal pain.   Skin: Negative for rash.   Neurological: Negative for headaches.   All other systems reviewed and are negative.       Vital Signs for last 24 hours   Temp:  [35.6 °C (96 °F)-36.6 °C (97.9 °F)] 36.6 °C (97.9 °F)  Pulse:  [] 107  Resp:  [15-58] 20  BP: (105-138)/(58-79) 105/70  SpO2:  [92 %-100 %] 100 %    Hemodynamic parameters for last 24 hours       Respiratory Information for the last 24 hours       Physical Exam   Physical Exam  Vitals and nursing note reviewed.   Constitutional:       Appearance: He is ill-appearing. He is not toxic-appearing.   HENT:      Head: Normocephalic and atraumatic.      Right Ear: External ear normal.      Left Ear: External ear normal.      Nose: Nose normal.      Mouth/Throat:      Mouth: Mucous membranes are dry.      Pharynx: Oropharynx is clear.   Eyes:      Extraocular Movements: Extraocular movements intact.      Pupils: Pupils are equal, round, and reactive to light.   Cardiovascular:      Rate and Rhythm: Regular rhythm. Tachycardia present.      Pulses: Normal pulses.      Heart sounds: Normal heart sounds.   Pulmonary:      Effort: Tachypnea and respiratory distress present.      Breath sounds: Decreased breath sounds present.      Comments: Coarse bilateral breath sounds  Abdominal:      General: Bowel sounds are normal.      Palpations: Abdomen is soft.   Musculoskeletal:         General: Normal range of motion.      Cervical back: Normal range of motion and neck supple.      " Right lower leg: No edema.      Left lower leg: No edema.   Skin:     General: Skin is warm and dry.      Capillary Refill: Capillary refill takes less than 2 seconds.   Neurological:      General: No focal deficit present.      Mental Status: He is alert.         Medications  Current Facility-Administered Medications   Medication Dose Route Frequency Provider Last Rate Last Admin   • vancomycin (VANCOCIN) 2,000 mg in  mL IVPB  2,000 mg Intravenous Q12HR Jeremy M Gonda, M.D.   Stopped at 09/29/21 0500   • magnesium sulfate IVPB premix 2 g  2 g Intravenous Once Álvaro Martinez M.D. 25 mL/hr at 09/29/21 1030 2 g at 09/29/21 1030   • morphine PCA 1 mg/mL in 50 mL (PCA)   Intravenous Continuous Álvaro Martinez M.D.        And   • Pharmacy Consult Request ...Pain Management Review 1 Each  1 Each Other PHARMACY TO DOSE Álvaro Martinez M.D.       • senna-docusate (PERICOLACE or SENOKOT S) 8.6-50 MG per tablet 2 Tablet  2 Tablet Oral BID Jeremy M Gonda, M.D.   2 Tablet at 09/29/21 0520    And   • polyethylene glycol/lytes (MIRALAX) PACKET 1 Packet  1 Packet Oral QDAY PRN Jeremy M Gonda, M.D.        And   • magnesium hydroxide (MILK OF MAGNESIA) suspension 30 mL  30 mL Oral QDAY PRN Jeremy M Gonda, M.D.        And   • bisacodyl (DULCOLAX) suppository 10 mg  10 mg Rectal QDAY PRN Jeremy M Gonda, M.D.       • Respiratory Therapy Consult   Nebulization Continuous RT Jeremy M Gonda, M.D.       • acetaminophen (Tylenol) tablet 650 mg  650 mg Oral Q6HRS PRN Jeremy M Gonda, M.D.   650 mg at 09/29/21 0548   • ondansetron (ZOFRAN) syringe/vial injection 4 mg  4 mg Intravenous Q4HRS PRN Jeremy M Gonda, M.D.   4 mg at 09/29/21 0013   • ondansetron (ZOFRAN ODT) dispertab 4 mg  4 mg Oral Q4HRS PRN Jeremy M Gonda, M.D.       • promethazine (PHENERGAN) tablet 12.5-25 mg  12.5-25 mg Oral Q4HRS PRN Jeremy M Gonda, M.D.       • promethazine (PHENERGAN) suppository 12.5-25 mg  12.5-25 mg Rectal Q4HRS PRN Jeremy M Gonda, M.D.        • prochlorperazine (COMPAZINE) injection 5-10 mg  5-10 mg Intravenous Q4HRS PRN Jeremy M Gonda, M.D.       • insulin regular (HumuLIN R,NovoLIN R) injection  2-9 Units Subcutaneous Q6HRS Jeremy M Gonda, M.D.   2 Units at 09/29/21 0521    And   • glucose 4 g chewable tablet 16 g  16 g Oral Q15 MIN PRN Jeremy M Gonda, M.D.        And   • dextrose 50% (D50W) injection 50 mL  50 mL Intravenous Q15 MIN PRN Jeremy M Gonda, M.D.       • cefepime (Maxipime) 2 g in  mL IVPB  2 g Intravenous Q12HRS Jeremy M Gonda, M.D.   Stopped at 09/29/21 0551   • MD Alert...Vancomycin per Pharmacy   Other PHARMACY TO DOSE Jeremy M Gonda, M.D.       • benzocaine-menthol (CEPACOL) lozenge 1 Lozenge  1 Lozenge Mouth/Throat Q2HRS PRN Jeremy M Gonda, M.D.   1 Lozenge at 09/29/21 0836   • guaiFENesin (ROBITUSSIN) 100 MG/5ML solution 200 mg  10 mL Oral Q4HRS PRN Jeremy M Gonda, M.D.   200 mg at 09/29/21 1030       Fluids    Intake/Output Summary (Last 24 hours) at 9/29/2021 1115  Last data filed at 9/29/2021 0800  Gross per 24 hour   Intake 2268.15 ml   Output 685 ml   Net 1583.15 ml       Laboratory          Recent Labs     09/28/21  2340 09/29/21  0500   SODIUM 133* 132*   POTASSIUM 4.9 5.1   CHLORIDE 97 97   CO2 21 24   BUN 22 21   CREATININE 1.09 0.82   MAGNESIUM  --  1.9   CALCIUM 8.8 8.8     Recent Labs     09/28/21  2340 09/29/21  0500   GLUCOSE 192* 176*     Recent Labs     09/29/21  0500   WBC 22.9*   NEUTSPOLYS 81.90*   LYMPHOCYTES 7.90*   MONOCYTES 8.70   EOSINOPHILS 0.00   BASOPHILS 0.10     Recent Labs     09/29/21  0500   RBC 3.79*   HEMOGLOBIN 11.1*   HEMATOCRIT 34.5*   PLATELETCT 480*       Imaging  X-Ray:  I have personally reviewed the images and compared with prior images.    Assessment/Plan  * Pneumonia due to COVID-19 virus  Assessment & Plan  COVID-19 pneumonia  Dexamethasone - completed at OSH  Remdesivir - completed at OSH  Baricitinab - received at OSH  Fluid balance - keep euvolemic  Antitussives, supportive  care, monitoring LFTs    Acute hypoxic respiratory failure due to COVID-19 pneumonia  Titrate to keep O2 saturation > 88%  Encourage self-proning  Encourage incentive spirometry  Aggressive pulmonary hygiene    Continue empiric cefepime and vancomycin for possible secondary bacterial pneumonia  Follow-up on OSH cultures/MRSA nares  Procalcitonin 1.40    Thrombocytosis- (present on admission)  Assessment & Plan  Likely reactive  Monitor    Type 2 diabetes mellitus without complication, without long-term current use of insulin (HCC)- (present on admission)  Assessment & Plan  Insulin sliding scale  Diabetic diet monitoring glucose with goal between 120 and 180    Pneumatocele of lung  Assessment & Plan  Bilateral (left greater than right) with proteinaceous fluid consistent with most likely blood  Thoracic surgery consult - Dr. SIVAN Montes to see patient  Avoid anticoagulation  Monitor hemoptysis  Continue chest tube drainage  CT chest images from OSH uploaded in PACS    Bilateral pneumothoraces  Assessment & Plan  Secondary to COVID-19  S/P bilateral chest tubes placed at outside hospital (right 9/26, left 9/28)  Continue bilateral chest tubes to -20 cmH2O of suction  Monitor output, leak  Treat pain w/ morphine PCA  Thoracic surgery consult  Daily chest x-ray    Acute respiratory failure with hypoxia (HCC)  Assessment & Plan  Acute hypoxic respiratory failure due to COVID-19 pneumonia, pneumothoraces, and pneumatoceles  Titrate to keep O2 saturation > 88%  Encourage self-proning  Encourage incentive spirometry  Aggressive pulmonary hygiene     DVT prophylaxis: No chemical DVT prophylaxis 2/2 hemoptysis and hemothorax  PUD prophylaxis: Not indicated  Glycemic control: Sliding scale insulin  Nutrition: NPO  Lines: None  Simon: None  Mobility: Early mobility as tolerated  Goals of care: Full code  Disposition: ICU    I have performed a physical exam and reviewed and updated ROS and Plan today (9/29/2021). In review of  yesterday's note (9/28/2021), there are no changes except as documented above.     Discussed patient condition and risk of morbidity and/or mortality with RN, RT, Pharmacy, Charge nurse / hot rounds, Patient and general surgery     The patient remains critically ill.  Critical care time = 50 minutes in directly providing and coordinating critical care and extensive data review.  No time overlap and excludes procedures.

## 2021-09-30 ENCOUNTER — APPOINTMENT (OUTPATIENT)
Dept: RADIOLOGY | Facility: MEDICAL CENTER | Age: 31
DRG: 199 | End: 2021-09-30
Attending: INTERNAL MEDICINE
Payer: COMMERCIAL

## 2021-09-30 PROBLEM — D62 ANEMIA ASSOCIATED WITH ACUTE BLOOD LOSS: Status: ACTIVE | Noted: 2021-09-30

## 2021-09-30 PROBLEM — I82.409 DVT (DEEP VENOUS THROMBOSIS) (HCC): Status: ACTIVE | Noted: 2021-09-30

## 2021-09-30 LAB
ANION GAP SERPL CALC-SCNC: 7 MMOL/L (ref 7–16)
ANION GAP SERPL CALC-SCNC: 9 MMOL/L (ref 7–16)
BASOPHILS # BLD AUTO: 0.1 % (ref 0–1.8)
BASOPHILS # BLD: 0.02 K/UL (ref 0–0.12)
BUN SERPL-MCNC: 11 MG/DL (ref 8–22)
BUN SERPL-MCNC: 16 MG/DL (ref 8–22)
CALCIUM SERPL-MCNC: 7.3 MG/DL (ref 8.5–10.5)
CALCIUM SERPL-MCNC: 8.8 MG/DL (ref 8.5–10.5)
CHLORIDE SERPL-SCNC: 100 MMOL/L (ref 96–112)
CHLORIDE SERPL-SCNC: 98 MMOL/L (ref 96–112)
CO2 SERPL-SCNC: 24 MMOL/L (ref 20–33)
CO2 SERPL-SCNC: 29 MMOL/L (ref 20–33)
CREAT SERPL-MCNC: 0.54 MG/DL (ref 0.5–1.4)
CREAT SERPL-MCNC: 0.62 MG/DL (ref 0.5–1.4)
EOSINOPHIL # BLD AUTO: 0.23 K/UL (ref 0–0.51)
EOSINOPHIL NFR BLD: 1.7 % (ref 0–6.9)
ERYTHROCYTE [DISTWIDTH] IN BLOOD BY AUTOMATED COUNT: 46.3 FL (ref 35.9–50)
GLUCOSE BLD-MCNC: 113 MG/DL (ref 65–99)
GLUCOSE BLD-MCNC: 121 MG/DL (ref 65–99)
GLUCOSE BLD-MCNC: 134 MG/DL (ref 65–99)
GLUCOSE BLD-MCNC: 179 MG/DL (ref 65–99)
GLUCOSE SERPL-MCNC: 135 MG/DL (ref 65–99)
GLUCOSE SERPL-MCNC: 167 MG/DL (ref 65–99)
HCT VFR BLD AUTO: 27.8 % (ref 42–52)
HGB BLD-MCNC: 8.9 G/DL (ref 14–18)
IMM GRANULOCYTES # BLD AUTO: 0.12 K/UL (ref 0–0.11)
IMM GRANULOCYTES NFR BLD AUTO: 0.9 % (ref 0–0.9)
LYMPHOCYTES # BLD AUTO: 1.54 K/UL (ref 1–4.8)
LYMPHOCYTES NFR BLD: 11.2 % (ref 22–41)
MAGNESIUM SERPL-MCNC: 2 MG/DL (ref 1.5–2.5)
MCH RBC QN AUTO: 29 PG (ref 27–33)
MCHC RBC AUTO-ENTMCNC: 32 G/DL (ref 33.7–35.3)
MCV RBC AUTO: 90.6 FL (ref 81.4–97.8)
MONOCYTES # BLD AUTO: 1.3 K/UL (ref 0–0.85)
MONOCYTES NFR BLD AUTO: 9.4 % (ref 0–13.4)
NEUTROPHILS # BLD AUTO: 10.58 K/UL (ref 1.82–7.42)
NEUTROPHILS NFR BLD: 76.7 % (ref 44–72)
NRBC # BLD AUTO: 0 K/UL
NRBC BLD-RTO: 0 /100 WBC
PLATELET # BLD AUTO: 470 K/UL (ref 164–446)
PMV BLD AUTO: 9.1 FL (ref 9–12.9)
POTASSIUM SERPL-SCNC: 4 MMOL/L (ref 3.6–5.5)
POTASSIUM SERPL-SCNC: 4.3 MMOL/L (ref 3.6–5.5)
RBC # BLD AUTO: 3.07 M/UL (ref 4.7–6.1)
SODIUM SERPL-SCNC: 131 MMOL/L (ref 135–145)
SODIUM SERPL-SCNC: 136 MMOL/L (ref 135–145)
WBC # BLD AUTO: 13.8 K/UL (ref 4.8–10.8)

## 2021-09-30 PROCEDURE — 71045 X-RAY EXAM CHEST 1 VIEW: CPT

## 2021-09-30 PROCEDURE — 770006 HCHG ROOM/CARE - MED/SURG/GYN SEMI*

## 2021-09-30 PROCEDURE — 700111 HCHG RX REV CODE 636 W/ 250 OVERRIDE (IP): Performed by: INTERNAL MEDICINE

## 2021-09-30 PROCEDURE — 99255 IP/OBS CONSLTJ NEW/EST HI 80: CPT | Performed by: HOSPITALIST

## 2021-09-30 PROCEDURE — 82962 GLUCOSE BLOOD TEST: CPT | Mod: 91

## 2021-09-30 PROCEDURE — 700105 HCHG RX REV CODE 258: Performed by: EMERGENCY MEDICINE

## 2021-09-30 PROCEDURE — 90686 IIV4 VACC NO PRSV 0.5 ML IM: CPT | Performed by: HOSPITALIST

## 2021-09-30 PROCEDURE — A9270 NON-COVERED ITEM OR SERVICE: HCPCS | Performed by: INTERNAL MEDICINE

## 2021-09-30 PROCEDURE — 36415 COLL VENOUS BLD VENIPUNCTURE: CPT

## 2021-09-30 PROCEDURE — 700111 HCHG RX REV CODE 636 W/ 250 OVERRIDE (IP): Performed by: EMERGENCY MEDICINE

## 2021-09-30 PROCEDURE — 700105 HCHG RX REV CODE 258: Performed by: INTERNAL MEDICINE

## 2021-09-30 PROCEDURE — 700111 HCHG RX REV CODE 636 W/ 250 OVERRIDE (IP): Performed by: HOSPITALIST

## 2021-09-30 PROCEDURE — 3E02340 INTRODUCTION OF INFLUENZA VACCINE INTO MUSCLE, PERCUTANEOUS APPROACH: ICD-10-PCS | Performed by: HOSPITALIST

## 2021-09-30 PROCEDURE — 90471 IMMUNIZATION ADMIN: CPT

## 2021-09-30 PROCEDURE — 85025 COMPLETE CBC W/AUTO DIFF WBC: CPT

## 2021-09-30 PROCEDURE — 700102 HCHG RX REV CODE 250 W/ 637 OVERRIDE(OP): Performed by: INTERNAL MEDICINE

## 2021-09-30 PROCEDURE — 83735 ASSAY OF MAGNESIUM: CPT

## 2021-09-30 PROCEDURE — 99233 SBSQ HOSP IP/OBS HIGH 50: CPT | Performed by: INTERNAL MEDICINE

## 2021-09-30 PROCEDURE — 80048 BASIC METABOLIC PNL TOTAL CA: CPT | Mod: 91

## 2021-09-30 RX ADMIN — DOCUSATE SODIUM 50 MG AND SENNOSIDES 8.6 MG 2 TABLET: 8.6; 5 TABLET, FILM COATED ORAL at 18:31

## 2021-09-30 RX ADMIN — INFLUENZA A VIRUS A/VICTORIA/2570/2019 IVR-215 (H1N1) ANTIGEN (FORMALDEHYDE INACTIVATED), INFLUENZA A VIRUS A/TASMANIA/503/2020 IVR-221 (H3N2) ANTIGEN (FORMALDEHYDE INACTIVATED), INFLUENZA B VIRUS B/PHUKET/3073/2013 ANTIGEN (FORMALDEHYDE INACTIVATED), AND INFLUENZA B VIRUS B/WASHINGTON/02/2019 ANTIGEN (FORMALDEHYDE INACTIVATED) 0.5 ML: 15; 15; 15; 15 INJECTION, SUSPENSION INTRAMUSCULAR at 13:09

## 2021-09-30 RX ADMIN — VANCOMYCIN HYDROCHLORIDE 2000 MG: 500 INJECTION, POWDER, LYOPHILIZED, FOR SOLUTION INTRAVENOUS at 00:06

## 2021-09-30 RX ADMIN — CEFEPIME 2 G: 2 INJECTION, POWDER, FOR SOLUTION INTRAVENOUS at 05:17

## 2021-09-30 RX ADMIN — ONDANSETRON 4 MG: 2 INJECTION INTRAMUSCULAR; INTRAVENOUS at 18:37

## 2021-09-30 RX ADMIN — ACETAMINOPHEN 650 MG: 325 TABLET, FILM COATED ORAL at 18:37

## 2021-09-30 RX ADMIN — DOCUSATE SODIUM 50 MG AND SENNOSIDES 8.6 MG 2 TABLET: 8.6; 5 TABLET, FILM COATED ORAL at 05:17

## 2021-09-30 RX ADMIN — MORPHINE SULFATE: 50 INJECTION, SOLUTION, CONCENTRATE INTRAVENOUS at 11:33

## 2021-09-30 RX ADMIN — MAGNESIUM HYDROXIDE 30 ML: 400 SUSPENSION ORAL at 08:50

## 2021-09-30 RX ADMIN — ENOXAPARIN SODIUM 40 MG: 40 INJECTION SUBCUTANEOUS at 08:50

## 2021-09-30 RX ADMIN — CEFEPIME 2 G: 2 INJECTION, POWDER, FOR SOLUTION INTRAVENOUS at 18:31

## 2021-09-30 RX ADMIN — INSULIN HUMAN 2 UNITS: 100 INJECTION, SOLUTION PARENTERAL at 11:32

## 2021-09-30 ASSESSMENT — ENCOUNTER SYMPTOMS
SORE THROAT: 0
PHOTOPHOBIA: 0
HEADACHES: 0
SHORTNESS OF BREATH: 1
COUGH: 1
ABDOMINAL PAIN: 0
CHILLS: 1
FEVER: 0

## 2021-09-30 ASSESSMENT — COGNITIVE AND FUNCTIONAL STATUS - GENERAL
CLIMB 3 TO 5 STEPS WITH RAILING: A LOT
STANDING UP FROM CHAIR USING ARMS: A LITTLE
HELP NEEDED FOR BATHING: A LITTLE
WALKING IN HOSPITAL ROOM: A LITTLE
MOVING TO AND FROM BED TO CHAIR: A LITTLE
SUGGESTED CMS G CODE MODIFIER DAILY ACTIVITY: CJ
MOBILITY SCORE: 17
TURNING FROM BACK TO SIDE WHILE IN FLAT BAD: A LITTLE
MOVING FROM LYING ON BACK TO SITTING ON SIDE OF FLAT BED: A LITTLE
SUGGESTED CMS G CODE MODIFIER MOBILITY: CK
DRESSING REGULAR LOWER BODY CLOTHING: A LITTLE
DRESSING REGULAR UPPER BODY CLOTHING: A LITTLE
DAILY ACTIVITIY SCORE: 21

## 2021-09-30 ASSESSMENT — PAIN DESCRIPTION - PAIN TYPE
TYPE: ACUTE PAIN

## 2021-09-30 NOTE — PROGRESS NOTES
Patient is alert and oriented, transported to [facility] with Marta RODRIGUEZ via hospital bed on 5L oxymask. Ambulated from hallway to bed with FWW. Report given to Lisandra RODRIGUEZ.

## 2021-09-30 NOTE — ASSESSMENT & PLAN NOTE
-Continue to hold xarelto in case of surgery.  Initiate lovenox 40 mg BID  -AC likely can be stopped at DC as this likely contributed to hemothorax

## 2021-09-30 NOTE — ASSESSMENT & PLAN NOTE
-Pulmonary and thoracic surgery following  -Dr. Montes does not feel that any surgical intervention is indicated at this time  -Continue current antibiotics and monitor chest tube output  -Pulmonology team following  -Left chest tube discontinued on 10/1  -Right chest tube placed on waterseal on 10/1

## 2021-09-30 NOTE — PROGRESS NOTES
2045: Dr. Gordon at patient bedside to discuss with patient that L sided chest tube is in pericardium area and that he will pull tube back to position it into correct place. Pt verbalized understanding of procedure. This RN contacted pharmacist Kat about pt allergy to lidocaine. Kat recommending Tetracaine for local anesthetic.     2105: pt given 2 mL of local anesthetic by MD (see MAR). Sutures removed by Dr. Gordon and chest tube pulled back and resutured into place. Patient tolerated well. VSS. Stat chest xray ordered.

## 2021-09-30 NOTE — PROGRESS NOTES
"Critical Care Progress Note    Date of admission  9/28/2021    Chief Complaint  COVID-19 pneumonia with bilateral pneumothorax/pneumatoceles     \"31 y.o. male who was transferred from [facility] in [facility] to [facility] on 9/28/2021 with a past medical history significant for diet-controlled diabetes, hypertension and hyperlipidemia who is unvaccinated against Covid and unfortunately developed a COVID-19 viral pneumonia dating back to August 30.  He was seen in the ED and/or hospitalized on 5 separate occasions between diagnosis and today for various episodes of shortness of breath.  Most recently, he was admitted from September 22-25 and was discharged on Xarelto and 3 L/min nasal cannula.  On 26 September, patient was turning to his side at home when he developed sudden onset of bilateral severe chest pain with shortness of breath and his oxygen monitor at home dropped to 36%.  He was brought back into the emergency department where he was found to have a large right-sided pneumothorax and a small left-sided loculated pneumothorax.  General surgery placed a right-sided chest tube.  Patient was placed on Remdesivir, vancomycin, cefdinir, and Decadron.  The left-sided pneumothorax was monitored but unfortunately worsened and patient continued to have hemoptysis.  A CT chest on 9/28 showed bilateral large pneumatoceles with blood attenuation material in each side as well as residual pneumothoraces left greater than right.  The general surgeon then placed a left-sided chest tube.  Given lack of thoracic surgery at [facility] where patient was admitted, he was transferred to [facility] for higher level of care.  En route, patient required several doses of narcotics for pain management and was placed on 10 L/min facemask.  Patient denies prior history of lung disease other than \"exercise induced asthma\" and works in a [employer] as a  in [place], " "[place].\"    Hospital Course  9/28 transfer to [facility]  9/29 thoracic surgery consultation - SIVAN Montes to see patient, chest tubes to -20 cm PleuraVac    Interval problem update    - Doing well overnight, pain controlled with PCA   - Neuro: AOx4   - HR: 70s-120s   - SBP: 110s-120s   - GI: tolerating   - UOP: 1.3L/24 hrs   - Simon: no   - Tm: 98.1   - Lines: PIV   - PPx: GI not indicated, DVT hematocele, hemoptysis   - 4L NC   - mPCA   - CXR (personally reviewed and compared to prior): slightly improved   - Consulted pulmonary for continued management on the floor    review of Systems  Review of Systems   Constitutional: Positive for chills and malaise/fatigue. Negative for fever.   HENT: Negative for sore throat.    Eyes: Negative for photophobia.   Respiratory: Positive for cough and shortness of breath.    Cardiovascular: Negative for chest pain.   Gastrointestinal: Negative for abdominal pain.   Skin: Negative for rash.   Neurological: Negative for headaches.   All other systems reviewed and are negative.       Vital Signs for last 24 hours   Temp:  [36.1 °C (97 °F)-36.7 °C (98.1 °F)] 36.7 °C (98 °F)  Pulse:  [] 116  Resp:  [15-60] 35  BP: (105-132)/(58-80) 123/65  SpO2:  [91 %-100 %] 95 %    Hemodynamic parameters for last 24 hours       Respiratory Information for the last 24 hours       Physical Exam   Physical Exam  Vitals and nursing note reviewed.   Constitutional:       Appearance: He is ill-appearing. He is not toxic-appearing.   HENT:      Head: Normocephalic and atraumatic.      Right Ear: External ear normal.      Left Ear: External ear normal.      Nose: Nose normal.      Mouth/Throat:      Mouth: Mucous membranes are dry.      Pharynx: Oropharynx is clear.   Eyes:      Extraocular Movements: Extraocular movements intact.      Pupils: Pupils are equal, round, and reactive to light.   Cardiovascular:      Rate and Rhythm: Regular rhythm. Tachycardia present.      Pulses: Normal pulses.      " Heart sounds: Normal heart sounds.   Pulmonary:      Effort: Tachypnea present. No respiratory distress.      Breath sounds: Decreased breath sounds present.      Comments: Coarse bilateral breath sounds  Chest:       Abdominal:      General: Bowel sounds are normal.      Palpations: Abdomen is soft.   Musculoskeletal:         General: Normal range of motion.      Cervical back: Normal range of motion and neck supple.      Right lower leg: No edema.      Left lower leg: No edema.   Skin:     General: Skin is warm and dry.      Capillary Refill: Capillary refill takes less than 2 seconds.   Neurological:      General: No focal deficit present.      Mental Status: He is alert.         Medications  Current Facility-Administered Medications   Medication Dose Route Frequency Provider Last Rate Last Admin   • vancomycin (VANCOCIN) 2,000 mg in  mL IVPB  2,000 mg Intravenous Q12HR Jeremy M Gonda, M.D.   Stopped at 09/30/21 0206   • morphine PCA 1 mg/mL in 50 mL (PCA)   Intravenous Continuous Álvaro Martinez M.D.   New Bag at 09/29/21 1244    And   • Pharmacy Consult Request ...Pain Management Review 1 Each  1 Each Other PHARMACY TO DOSE Álvaro Martinez M.D.       • LIDOCAINE HCL 1 % INJ SOLN            • senna-docusate (PERICOLACE or SENOKOT S) 8.6-50 MG per tablet 2 Tablet  2 Tablet Oral BID Jeremy M Gonda, M.D.   2 Tablet at 09/30/21 0517    And   • polyethylene glycol/lytes (MIRALAX) PACKET 1 Packet  1 Packet Oral QDAY PRN Jeremy M Gonda, M.D.   1 Packet at 09/29/21 1733    And   • magnesium hydroxide (MILK OF MAGNESIA) suspension 30 mL  30 mL Oral QDAY PRN Jeremy M Gonda, M.D.        And   • bisacodyl (DULCOLAX) suppository 10 mg  10 mg Rectal QDAY PRN Jeremy M Gonda, M.D.       • Respiratory Therapy Consult   Nebulization Continuous RT Jeremy M Gonda, M.D.       • acetaminophen (Tylenol) tablet 650 mg  650 mg Oral Q6HRS PRN Jeremy M Gonda, M.D.   650 mg at 09/29/21 0548   • ondansetron (ZOFRAN) syringe/vial  injection 4 mg  4 mg Intravenous Q4HRS PRN Jeremy M Gonda, M.D.   4 mg at 09/29/21 2146   • ondansetron (ZOFRAN ODT) dispertab 4 mg  4 mg Oral Q4HRS PRN Jeremy M Gonda, M.D.       • promethazine (PHENERGAN) tablet 12.5-25 mg  12.5-25 mg Oral Q4HRS PRN Jeremy M Gonda, M.D.       • promethazine (PHENERGAN) suppository 12.5-25 mg  12.5-25 mg Rectal Q4HRS PRN Jeremy M Gonda, M.D.       • prochlorperazine (COMPAZINE) injection 5-10 mg  5-10 mg Intravenous Q4HRS PRN Jeremy M Gonda, M.D.       • insulin regular (HumuLIN R,NovoLIN R) injection  2-9 Units Subcutaneous Q6HRS Jeremy M Gonda, M.D.   2 Units at 09/29/21 1739    And   • glucose 4 g chewable tablet 16 g  16 g Oral Q15 MIN PRN Jeremy M Gonda, M.D.        And   • dextrose 50% (D50W) injection 50 mL  50 mL Intravenous Q15 MIN PRN Jeremy M Gonda, M.D.       • cefepime (Maxipime) 2 g in  mL IVPB  2 g Intravenous Q12HRS Jeremy M Gonda, M.D.   Stopped at 09/30/21 0547   • MD Alert...Vancomycin per Pharmacy   Other PHARMACY TO DOSE Jeremy M Gonda, M.D.       • benzocaine-menthol (CEPACOL) lozenge 1 Lozenge  1 Lozenge Mouth/Throat Q2HRS PRN Jeremy M Gonda, M.D.   1 Lozenge at 09/29/21 0836   • guaiFENesin (ROBITUSSIN) 100 MG/5ML solution 200 mg  10 mL Oral Q4HRS PRN Jeremy M Gonda, M.D.   200 mg at 09/29/21 1030       Fluids    Intake/Output Summary (Last 24 hours) at 9/30/2021 0736  Last data filed at 9/30/2021 0600  Gross per 24 hour   Intake 2620 ml   Output 1468 ml   Net 1152 ml       Laboratory          Recent Labs     09/28/21  2340 09/29/21  0500 09/30/21  0510   SODIUM 133* 132* 131*   POTASSIUM 4.9 5.1 4.0   CHLORIDE 97 97 98   CO2 21 24 24   BUN 22 21 16   CREATININE 1.09 0.82 0.62   MAGNESIUM  --  1.9 2.0   PHOSPHORUS  --  4.0  --    CALCIUM 8.8 8.8 7.3*     Recent Labs     09/28/21  2340 09/29/21  0500 09/30/21  0510   GLUCOSE 192* 176* 167*     Recent Labs     09/29/21  0500 09/30/21  0510   WBC 22.9* 13.8*   NEUTSPOLYS 81.90* 76.70*   LYMPHOCYTES 7.90*  11.20*   MONOCYTES 8.70 9.40   EOSINOPHILS 0.00 1.70   BASOPHILS 0.10 0.10     Recent Labs     09/29/21  0500 09/30/21  0510   RBC 3.79* 3.07*   HEMOGLOBIN 11.1* 8.9*   HEMATOCRIT 34.5* 27.8*   PLATELETCT 480* 470*       Imaging  X-Ray:  I have personally reviewed the images and compared with prior images.    Assessment/Plan  * Pneumonia due to COVID-19 virus- (present on admission)  Assessment & Plan  COVID-19 pneumonia  Dexamethasone, remdesivir and Baricitinab - at OSH  Fluid balance - keep euvolemic  Antitussives, supportive care, monitoring LFTs    Acute hypoxic respiratory failure due to COVID-19 pneumonia  Titrate to keep O2 saturation >88%  Encourage self-proning  Encourage incentive spirometry  Aggressive pulmonary hygiene  Continue empiric cefepime for possible secondary bacterial pneumonia  MRSA nares negative  Procalcitonin 1.40    Thrombocytosis- (present on admission)  Assessment & Plan  Likely reactive  Monitor    Type 2 diabetes mellitus without complication, without long-term current use of insulin (HCC)- (present on admission)  Assessment & Plan  Goal blood glucose 140-180  sliding scale insulin, accuchecks  hypoglycemia protocol    Pneumatocele of lung- (present on admission)  Assessment & Plan  Bilateral (left greater than right) with proteinaceous fluid consistent with most likely blood  Thoracic surgery consulted - Dr. Montes on board  Avoid anticoagulation  Monitor hemoptysis  Continue chest tube drainage  CT chest images from OSH uploaded in PACS    Bilateral pneumothoraces- (present on admission)  Assessment & Plan  Secondary to COVID-19  S/P bilateral chest tubes placed at outside hospital (right 9/26, left 9/28)  Place bilateral chest tubes to waterseal  Monitor output, leak  Treat pain w/ morphine PCA  Thoracic surgery on board  Daily chest x-ray    Acute respiratory failure with hypoxia (HCC)- (present on admission)  Assessment & Plan  Acute hypoxic respiratory failure due to COVID-19  pneumonia, pneumothoraces, and pneumatoceles  Titrate to keep O2 saturation >88%  Encourage self-proning  Encourage incentive spirometry  Aggressive pulmonary hygiene     Patient stable to be transferred out of ICU today to [facility].  I have discussed this case with hospitalist Dr. Swati Zhang, he will assume care at this time. [facility] will sign off. Please call with any questions.    I have performed a physical exam and reviewed and updated ROS and Plan today (9/30/2021). In review of yesterday's note (9/29/2021), there are no changes except as documented above.     Discussed patient condition and risk of morbidity and/or mortality with RN, RT, Pharmacy, Charge nurse / hot rounds, Patient and Pulmonary

## 2021-09-30 NOTE — CONSULTS
Hospital Medicine Consultation    Date of Service  9/30/2021    Referring Physician  Kenn Best Jr., D.O.    Consulting Physician  Nickolas Zhang M.D.    Reason for Consultation  Assume medical care    History of Presenting Illness  31 y.o. male who presented 9/28/2021 with history of diet-controlled diabetes exercise-induced asthma who developed COVID-19 viral pneumonia 5 weeks ago.  He was initially seen in the emergency department and had multiple hospitalization over the past month for complications secondary to his COVID-19 pneumonia.  He was admitted last on September 22-25 diagnosed with lower extremity DVT and discharged on Xarelto and oxygen supplementation.  On September 26 he developed sudden onset of bilateral severe chest pain with worsening dyspnea and severe hypoxia.  He presented back to the emergency department where he was noted to have bilateral pneumothoraces he underwent chest tube placement and was started on remdesivir vancomycin cefdinir and Decadron.  He was having hemoptysis and he had a CT of the chest which revealed bilateral large pneumatoceles with blood attenuation on each side as well as residual pneumothoraces left greater than right he was subsequently transferred to our facility for thoracic surgery evaluation.  He was admitted to the intensive care unit continued on broad-spectrum antibiotics.  Anticoagulation was discontinued secondary to his hemoptysis and CT findings.  He was evaluated by Dr. Montes from thoracic surgery and had a repeat CT yesterday.  Patient complains of chest wall pain chest tube sites and is having productive cough with hemoptysis.  No fever or chills.  He is currently on 4 L nasal cannula.  He has been having intermittent nausea and poor appetite.  No melena or rectal bleeding.  He is complaining of generalized malaise.  Chest tubes have been placed on waterseal this morning per surgery recommendations    Review of Systems  Review of Systems    All other systems reviewed and are negative.      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    Medications  Current Facility-Administered Medications   Medication Dose Route Frequency Provider Last Rate Last Admin   • enoxaparin (LOVENOX) inj 40 mg  40 mg Subcutaneous DAILY Kenn Best Jr., D.O.   40 mg at 09/30/21 0850   • morphine PCA 1 mg/mL in 50 mL (PCA)   Intravenous Continuous lÁvaro Martinez M.D.   New Bag at 09/30/21 1133    And   • Pharmacy Consult Request ...Pain Management Review 1 Each  1 Each Other PHARMACY TO DOSE Álvaro Martinez M.D.       • senna-docusate (PERICOLACE or SENOKOT S) 8.6-50 MG per tablet 2 Tablet  2 Tablet Oral BID Jeremy M Gonda, M.D.   2 Tablet at 09/30/21 0517    And   • polyethylene glycol/lytes (MIRALAX) PACKET 1 Packet  1 Packet Oral QDAY PRN Jeremy M Gonda, M.D.   1 Packet at 09/29/21 1733    And   • magnesium hydroxide (MILK OF MAGNESIA) suspension 30 mL  30 mL Oral QDAY PRN Jeremy M Gonda, M.D.   30 mL at 09/30/21 0850    And   • bisacodyl (DULCOLAX) suppository 10 mg  10 mg Rectal QDAY PRN Jeremy M Gonda, M.D.       • Respiratory Therapy Consult   Nebulization Continuous RT Jeremy M Gonda, M.D.       • acetaminophen (Tylenol) tablet 650 mg  650 mg Oral Q6HRS PRN Jeremy M Gonda, M.D.   650 mg at 09/29/21 0548   • ondansetron (ZOFRAN) syringe/vial injection 4 mg  4 mg Intravenous Q4HRS PRN Jeremy M Gonda, M.D.   4 mg at 09/29/21 2146   • ondansetron (ZOFRAN ODT) dispertab 4 mg  4 mg Oral Q4HRS PRN Jeremy M Gonda, M.D.       • promethazine (PHENERGAN) tablet 12.5-25 mg  12.5-25 mg Oral Q4HRS PRN Jeremy M Gonda, M.D.       • promethazine (PHENERGAN) suppository  "12.5-25 mg  12.5-25 mg Rectal Q4HRS PRN Jeremy M Gonda, M.D.       • prochlorperazine (COMPAZINE) injection 5-10 mg  5-10 mg Intravenous Q4HRS PRN Jeremy M Gonda, M.D.       • insulin regular (HumuLIN R,NovoLIN R) injection  2-9 Units Subcutaneous Q6HRS Jeremy M Gonda, M.D.   2 Units at 09/30/21 1132    And   • glucose 4 g chewable tablet 16 g  16 g Oral Q15 MIN PRN Jeremy M Gonda, M.D.        And   • dextrose 50% (D50W) injection 50 mL  50 mL Intravenous Q15 MIN PRN Jeremy M Gonda, M.D.       • cefepime (Maxipime) 2 g in  mL IVPB  2 g Intravenous Q12HRS Jeremy M Gonda, M.D.   Stopped at 09/30/21 0547   • benzocaine-menthol (CEPACOL) lozenge 1 Lozenge  1 Lozenge Mouth/Throat Q2HRS PRN Jeremy M Gonda, M.D.   1 Lozenge at 09/29/21 0836   • guaiFENesin (ROBITUSSIN) 100 MG/5ML solution 200 mg  10 mL Oral Q4HRS PRN Jeremy M Gonda, M.D.   200 mg at 09/29/21 1030       Allergies  Allergies   Allergen Reactions   • Lidocaine Rash     Patient states \"it gives me blisters where it touches\"       Physical Exam  Temp:  [36.1 °C (97 °F)-36.8 °C (98.2 °F)] (P) 36.8 °C (98.2 °F)  Pulse:  [] 116  Resp:  [15-60] 35  BP: (113-132)/(58-80) 123/65  SpO2:  [91 %-100 %] 95 %    Physical Exam  Vitals and nursing note reviewed.   Constitutional:       Appearance: He is well-developed. He is not diaphoretic.   HENT:      Head: Normocephalic and atraumatic.      Mouth/Throat:      Pharynx: No oropharyngeal exudate.   Eyes:      General: No scleral icterus.        Right eye: No discharge.         Left eye: No discharge.      Conjunctiva/sclera: Conjunctivae normal.      Pupils: Pupils are equal, round, and reactive to light.   Neck:      Vascular: No JVD.      Trachea: No tracheal deviation.   Cardiovascular:      Rate and Rhythm: Regular rhythm. Tachycardia present.      Heart sounds: No murmur heard.   No friction rub. No gallop.    Pulmonary:      Effort: Pulmonary effort is normal. No respiratory distress.      Breath sounds: " No stridor. Decreased breath sounds and rhonchi present. No wheezing.      Comments: Bilateral chest tubes to waterseal no air leak  Abdominal:      General: Bowel sounds are normal. There is no distension.      Palpations: Abdomen is soft.      Tenderness: There is no abdominal tenderness. There is no rebound.   Musculoskeletal:         General: Swelling present. No tenderness.      Cervical back: Neck supple.   Skin:     General: Skin is warm and dry.      Nails: There is no clubbing.   Neurological:      Mental Status: He is alert and oriented to person, place, and time.      Cranial Nerves: No cranial nerve deficit.      Motor: No abnormal muscle tone.   Psychiatric:         Behavior: Behavior normal.         Fluids  Date 09/30/21 0700 - 10/01/21 0659   Shift 1504-1459 6330-0571 7202-0900 24 Hour Total   INTAKE   Shift Total       OUTPUT   Urine 480   480   Shift Total 480   480   Weight (kg) 129.6 129.6 129.6 129.6       Laboratory  Recent Labs     09/29/21  0500 09/30/21  0510   WBC 22.9* 13.8*   RBC 3.79* 3.07*   HEMOGLOBIN 11.1* 8.9*   HEMATOCRIT 34.5* 27.8*   MCV 91.0 90.6   MCH 29.3 29.0   MCHC 32.2* 32.0*   RDW 46.9 46.3   PLATELETCT 480* 470*   MPV 9.2 9.1     Recent Labs     09/28/21  2340 09/29/21  0500 09/30/21  0510   SODIUM 133* 132* 131*   POTASSIUM 4.9 5.1 4.0   CHLORIDE 97 97 98   CO2 21 24 24   GLUCOSE 192* 176* 167*   BUN 22 21 16   CREATININE 1.09 0.82 0.62   CALCIUM 8.8 8.8 7.3*                     Imaging  DX-CHEST-LIMITED (1 VIEW)   Final Result         Slightly improved airspace opacity in the right lung.         DX-CHEST-LIMITED (1 VIEW)   Final Result         No significant interval change. Left chest tube is in the left basilar hemithorax.      CT-CHEST (THORAX) WITH   Final Result      1.  Trace right and small left pneumothorax.   2.  There is a large left loculated high density fluid collection, may be consolidated blood in a pneumatocele.   3.  There is a left chest tube with the  tip in the pericardial fat. There is associated subcutaneous emphysema.   4.  There is a loculated high density right collection located posteriorly, similar in appearance to the collection on the left. There is a right pleural space drainage catheter with tip in the superior anterior pleura.   5.  Extensive mixed groundglass and consolidative bilateral pulmonary opacities.      Findings communicated with Dr. Fields by Dr. Verde at 4:41 PM 9/29/2021               DX-CHEST-LIMITED (1 VIEW)   Final Result      No significant interval change.      OUTSIDE IMAGES-US VASCULAR   Final Result      OUTSIDE IMAGES-DX CHEST   Final Result      CT-FOREIGN FILM CAT SCAN   Final Result      OUTSIDE IMAGES-DX CHEST   Final Result      OUTSIDE IMAGES-DX CHEST   Final Result      OUTSIDE IMAGES-DX CHEST   Final Result      OUTSIDE IMAGES-DX CHEST   Final Result      OUTSIDE IMAGES-DX CHEST   Final Result      OUTSIDE IMAGES-DX CHEST   Final Result      CT-FOREIGN FILM CAT SCAN   Final Result      DX-CHEST-LIMITED (1 VIEW)   Final Result         No significant interval change. Small left apical pneumothorax.      DX-CHEST-PORTABLE (1 VIEW)   Final Result         Left apical pneumothorax is seen. There is a bulging elliptical opacity in the left hemithorax which could relate to loculated fluid or hematoma.      CRITICAL RESULT READ BACK: Preliminary findings discussed with and critical read back performed by Dr. RADHA TAPIA JR via telephone on 9/28/2021 10:58 PM          Assessment/Plan  * Pneumonia due to COVID-19 virus  Assessment & Plan  MRSA PCR negative  Vancomycin discontinued  Continue cefepime  Continue close clinical monitoring  Previously treated with Decadron remdesivir and barcitinib    DVT (deep venous thrombosis) ([facility])  Assessment & Plan  Patient Xarelto has been on hold given hemoptysis and concern for bleeding in his pneumatoceles.  If hemoglobin is stable consider resuming anticoagulation with heparin drip when  bleeding risk felt to be acceptable by pulmonary discussed with Dr Best who will discuss with Dr Linn    Anemia associated with acute blood loss- (present on admission)  Assessment & Plan  Monitor hemoglobin and transfuse if less than 7      Thrombocytosis- (present on admission)  Assessment & Plan  Likely reactive monitor CBC    Type 2 diabetes mellitus without complication, without long-term current use of insulin (HCC)- (present on admission)  Assessment & Plan  Insulin sliding scale monitor CBGs.  Recent Labs     09/28/21  2306 09/29/21  0506 09/29/21  1154 09/29/21  1738 09/29/21  2342 09/30/21  0552 09/30/21  1129   POCGLUCOSE 168* 153* 142* 166* 121* 134* 179*        Check hemoglobin A1c    Pneumatocele of lung  Assessment & Plan  Pulmonary and thoracic surgery following  Dr. Montes does not feel that any surgical intervention is indicated at this time  Continue current antibiotics and monitor chest tube output      Bilateral pneumothoraces  Assessment & Plan  Chest tube management per surgery      Acute respiratory failure with hypoxia (HCC)  Assessment & Plan  Improved oxygen requirements  Chest tube management per surgery  RT protocol

## 2021-09-30 NOTE — PROGRESS NOTES
Patient arrived to the floor at this time from SICU, transported by SICU RN and SICU CCT. Report taken from Mahesh RODRIGUEZ. Patient ambulated from ICU bed to [place] bed with 2 assist, steady gait. In room 414-2. On 3L 02 nasal cannula. Chest tubes in place bilaterally, both to water seal. VSS. On . Morphine PCA for pain control with fair effect. Using call bell approp, oriented to the floor, whiteboard updated, patient educated on plan of care

## 2021-09-30 NOTE — PROGRESS NOTES
"Progress Note:  9/30/2021, 8:57 AM    S: No acute events.  Pain controlled at this time    CT chest evaluated.  Bilateral high density localized and loculated fluid collections which are likely intraparenchymal/within pneumatocele.  Diffuse consolidation/changes consistent with Covid pneumonia    O:  /65   Pulse (!) 116   Temp 36.7 °C (98 °F) (Temporal)   Resp (!) 35   Ht 1.905 m (6' 3\")   Wt (!) 130 kg (285 lb 11.5 oz)   SpO2 95%     NAD, awake, alert  Breathing is nonlabored at rest on oxygen mask  Bilateral chest tubes to suction, no air leak, minimal output      A:   Active Hospital Problems    Diagnosis    • Pneumonia due to COVID-19 virus [U07.1, J12.82]    • Acute respiratory failure with hypoxia (HCC) [J96.01]    • Bilateral pneumothoraces [J93.9]    • Pneumatocele of lung [J98.4]    • Type 2 diabetes mellitus without complication, without long-term current use of insulin (HCC) [E11.9]    • Thrombocytosis [D47.3]          P:   -Recommend placing chest tubes to waterseal  -Unfortunately, this patient has developed severe bilateral lung damage related to Covid pneumonia.  There is no indication for urgent surgical intervention at this time.  I suspect that the complex fluid collections are intraparenchymal/within pneumatocele I stated on CT.    -Recommend aggressive pulmonary supportive care, and interval imaging to monitor intrathoracic disease process.  He may require interventional percutaneous drainage at some point.   -Recommend pulmonary medicine involvement as well    Peewee Montes M.D.  [facility]  [phone]    "

## 2021-09-30 NOTE — ASSESSMENT & PLAN NOTE
-Requiring 5L O2 at rest via oxymask   -CXR showing decreased edema with stable left opacification   -Trial 40mg lasix as he does appear hypervolemic on exam   -Continue on empiric cefepime.   -Procal declining - if neg 10/7 can stop abx  -BNP wnl  -RT protocol  -Aggressive pulm toileting   -PT/OT to consult   -Continue to wean o2 as tolerated.

## 2021-09-30 NOTE — PLAN OF CARE (IOPOC)
Notified by radiology that left chest tube is sitting in the pericardium without entrance into the ventricle.  No output from the chest tube lately.    After discussing this with the patient I removed the previous sutures, anesthetized the skin and withdrew his chest tube 2 cm from 12 cm down to 10 cm.  I then resutured the chest tube in place and a dressing was applied.  He had pain with the procedure but otherwise tolerated it well.  Repeat chest x-ray pending    Gian Gordon M.D.

## 2021-09-30 NOTE — ASSESSMENT & PLAN NOTE
-Pulmonology, Dr. Linn consulted. Signed off   -LEFT removed on 10/1  -RIGHT removed on 10/2  -No recommendations for IR drain at this time per pulmonology and thoracic surgery  -Continue aggressive pulmonary toileting and ambulation as tolerated   -Continue cefepime

## 2021-09-30 NOTE — CARE PLAN
The patient is Watcher - Medium risk of patient condition declining or worsening    Shift Goals  Clinical Goals: Pulmonary hygiene, mobility, xfer orders  Patient Goals: Pain control  Family Goals: No family present    Progress made toward(s) clinical / shift goals:  calls approp for needs, on 3L 02, A+Ox4, pain controlled with morphine, voiding in urinal     Patient is not progressing towards the following goals: still has bilateral chest tubes, continue pulmonary toilet, pain control with morphine, no BM yet since admit , still on 02       Problem: Knowledge Deficit - Standard  Goal: Patient and family/care givers will demonstrate understanding of plan of care, disease process/condition, diagnostic tests and medications  Outcome: Progressing     Problem: Pain - Standard  Goal: Alleviation of pain or a reduction in pain to the patient’s comfort goal  Outcome: Progressing     Problem: Skin Integrity  Goal: Skin integrity is maintained or improved  Outcome: Progressing

## 2021-09-30 NOTE — ASSESSMENT & PLAN NOTE
-Continue close clinical monitoring  -Previously treated with Decadron remdesivir and barcitinib  -Treating empirically with cefepime

## 2021-10-01 ENCOUNTER — APPOINTMENT (OUTPATIENT)
Dept: RADIOLOGY | Facility: MEDICAL CENTER | Age: 31
DRG: 199 | End: 2021-10-01
Attending: STUDENT IN AN ORGANIZED HEALTH CARE EDUCATION/TRAINING PROGRAM
Payer: COMMERCIAL

## 2021-10-01 ENCOUNTER — APPOINTMENT (OUTPATIENT)
Dept: RADIOLOGY | Facility: MEDICAL CENTER | Age: 31
DRG: 199 | End: 2021-10-01
Attending: INTERNAL MEDICINE
Payer: COMMERCIAL

## 2021-10-01 LAB
ANION GAP SERPL CALC-SCNC: 7 MMOL/L (ref 7–16)
BASOPHILS # BLD AUTO: 0.2 % (ref 0–1.8)
BASOPHILS # BLD: 0.02 K/UL (ref 0–0.12)
BUN SERPL-MCNC: 11 MG/DL (ref 8–22)
CALCIUM SERPL-MCNC: 8.2 MG/DL (ref 8.5–10.5)
CHLORIDE SERPL-SCNC: 100 MMOL/L (ref 96–112)
CO2 SERPL-SCNC: 29 MMOL/L (ref 20–33)
CREAT SERPL-MCNC: 0.57 MG/DL (ref 0.5–1.4)
EOSINOPHIL # BLD AUTO: 0.16 K/UL (ref 0–0.51)
EOSINOPHIL NFR BLD: 1.8 % (ref 0–6.9)
ERYTHROCYTE [DISTWIDTH] IN BLOOD BY AUTOMATED COUNT: 45.1 FL (ref 35.9–50)
EST. AVERAGE GLUCOSE BLD GHB EST-MCNC: 146 MG/DL
GLUCOSE BLD-MCNC: 103 MG/DL (ref 65–99)
GLUCOSE BLD-MCNC: 133 MG/DL (ref 65–99)
GLUCOSE BLD-MCNC: 135 MG/DL (ref 65–99)
GLUCOSE BLD-MCNC: 187 MG/DL (ref 65–99)
GLUCOSE SERPL-MCNC: 152 MG/DL (ref 65–99)
HBA1C MFR BLD: 6.7 % (ref 4–5.6)
HCT VFR BLD AUTO: 26 % (ref 42–52)
HGB BLD-MCNC: 8.4 G/DL (ref 14–18)
IMM GRANULOCYTES # BLD AUTO: 0.06 K/UL (ref 0–0.11)
IMM GRANULOCYTES NFR BLD AUTO: 0.7 % (ref 0–0.9)
LYMPHOCYTES # BLD AUTO: 1.41 K/UL (ref 1–4.8)
LYMPHOCYTES NFR BLD: 16.1 % (ref 22–41)
MAGNESIUM SERPL-MCNC: 2.1 MG/DL (ref 1.5–2.5)
MCH RBC QN AUTO: 28.7 PG (ref 27–33)
MCHC RBC AUTO-ENTMCNC: 32.3 G/DL (ref 33.7–35.3)
MCV RBC AUTO: 88.7 FL (ref 81.4–97.8)
MONOCYTES # BLD AUTO: 0.87 K/UL (ref 0–0.85)
MONOCYTES NFR BLD AUTO: 9.9 % (ref 0–13.4)
NEUTROPHILS # BLD AUTO: 6.26 K/UL (ref 1.82–7.42)
NEUTROPHILS NFR BLD: 71.3 % (ref 44–72)
NRBC # BLD AUTO: 0 K/UL
NRBC BLD-RTO: 0 /100 WBC
PLATELET # BLD AUTO: 470 K/UL (ref 164–446)
PMV BLD AUTO: 9.1 FL (ref 9–12.9)
POTASSIUM SERPL-SCNC: 4.2 MMOL/L (ref 3.6–5.5)
RBC # BLD AUTO: 2.93 M/UL (ref 4.7–6.1)
SODIUM SERPL-SCNC: 136 MMOL/L (ref 135–145)
WBC # BLD AUTO: 8.8 K/UL (ref 4.8–10.8)

## 2021-10-01 PROCEDURE — 700105 HCHG RX REV CODE 258: Performed by: INTERNAL MEDICINE

## 2021-10-01 PROCEDURE — 770006 HCHG ROOM/CARE - MED/SURG/GYN SEMI*

## 2021-10-01 PROCEDURE — 83735 ASSAY OF MAGNESIUM: CPT

## 2021-10-01 PROCEDURE — 99233 SBSQ HOSP IP/OBS HIGH 50: CPT | Performed by: NURSE PRACTITIONER

## 2021-10-01 PROCEDURE — 82962 GLUCOSE BLOOD TEST: CPT | Mod: 91

## 2021-10-01 PROCEDURE — 36415 COLL VENOUS BLD VENIPUNCTURE: CPT

## 2021-10-01 PROCEDURE — 80048 BASIC METABOLIC PNL TOTAL CA: CPT

## 2021-10-01 PROCEDURE — 71045 X-RAY EXAM CHEST 1 VIEW: CPT

## 2021-10-01 PROCEDURE — 700102 HCHG RX REV CODE 250 W/ 637 OVERRIDE(OP): Performed by: INTERNAL MEDICINE

## 2021-10-01 PROCEDURE — 700111 HCHG RX REV CODE 636 W/ 250 OVERRIDE (IP): Performed by: INTERNAL MEDICINE

## 2021-10-01 PROCEDURE — 85025 COMPLETE CBC W/AUTO DIFF WBC: CPT

## 2021-10-01 PROCEDURE — A9270 NON-COVERED ITEM OR SERVICE: HCPCS | Performed by: INTERNAL MEDICINE

## 2021-10-01 PROCEDURE — 83036 HEMOGLOBIN GLYCOSYLATED A1C: CPT

## 2021-10-01 PROCEDURE — 99233 SBSQ HOSP IP/OBS HIGH 50: CPT | Mod: GC | Performed by: INTERNAL MEDICINE

## 2021-10-01 RX ADMIN — DOCUSATE SODIUM 50 MG AND SENNOSIDES 8.6 MG 2 TABLET: 8.6; 5 TABLET, FILM COATED ORAL at 17:52

## 2021-10-01 RX ADMIN — INSULIN HUMAN 2 UNITS: 100 INJECTION, SOLUTION PARENTERAL at 11:59

## 2021-10-01 RX ADMIN — CEFEPIME 2 G: 2 INJECTION, POWDER, FOR SOLUTION INTRAVENOUS at 17:52

## 2021-10-01 RX ADMIN — CEFEPIME 2 G: 2 INJECTION, POWDER, FOR SOLUTION INTRAVENOUS at 04:51

## 2021-10-01 RX ADMIN — ENOXAPARIN SODIUM 40 MG: 40 INJECTION SUBCUTANEOUS at 04:51

## 2021-10-01 RX ADMIN — DOCUSATE SODIUM 50 MG AND SENNOSIDES 8.6 MG 2 TABLET: 8.6; 5 TABLET, FILM COATED ORAL at 04:51

## 2021-10-01 ASSESSMENT — ENCOUNTER SYMPTOMS
NEUROLOGICAL NEGATIVE: 1
PSYCHIATRIC NEGATIVE: 1
MUSCULOSKELETAL NEGATIVE: 1
COUGH: 1
SHORTNESS OF BREATH: 1
CARDIOVASCULAR NEGATIVE: 1
CHILLS: 0
FEVER: 0
GASTROINTESTINAL NEGATIVE: 1
EYES NEGATIVE: 1

## 2021-10-01 ASSESSMENT — PAIN DESCRIPTION - PAIN TYPE
TYPE: ACUTE PAIN

## 2021-10-01 NOTE — PROGRESS NOTES
"Assumed care of patient from night shift RN.  Patient is alert and oriented times 4, states pain of 10/10, Morphine PCA in use.  Patient very sleepy, reminded that he can use the bolus button as needed for pain control.  VSS /68   Pulse 100   Temp 36.9 °C (98.5 °F) (Temporal)   Resp 18   Ht 1.905 m (6' 3\")   Wt (!) 130 kg (285 lb 11.5 oz)   SpO2 94%   BMI 35.71 kg/m²   PIV in the LUE, patent and saline locked.  On 4L oxymask with saturations in the 90s.  Tachycardia noted.  Last BM PTA, urinating without difficulty.  Regular diet, tolerating well.  Bilateral chest tubes, to waterseal.  No leaks noted.  Abdominal bruising.  Patient is a SBA, demonstrates steady gait, minimal assistance needed.  Patient encouraged to ambulate with staff.  POC discussed for the day, bed is locked and in the lowest position, call light is within reach.  All needs are met at this time, hourly rounding is in place.  "

## 2021-10-01 NOTE — HOSPITAL COURSE
Mr. Boggs is a 31-year-old male with a PMHx of DM T2, asthma, who developed COVID-19 viral pneumonia approximately 5 weeks ago and has had multiple admissions since then.  Patient was transferred over from [facility] to [facility] for [place] care.  Patient was initially in the emergency department and had multiple hospitalization over the past month for complications secondary to COVID-19 pneumonia.    Admission on 9/22-9/25 diagnosed him with lower extremity DVT and was discharged on Xarelto and oxygen supplementation.    On 9/26, patient developed sudden onset of bilateral severe chest pain with worsening dyspnea and severe hypoxia.  Patient presented back to the emergency department where he was noted to have bilateral pneumothoraces and underwent chest tube placement and was started on remdesivir, vancomycin, cefdinir, and Decadron.  Patient also started to have hemoptysis and CT of the chest revealed bilateral large pneumatoceles with blood attenuation on each side as well as residual pneumothoraces left greater than right which she was subsequently transferred to our facility for thoracic surgery evaluation.    Patient initially admitted to the intensive care unit where broad-spectrum antibiotics was continued.  Anticoagulation was also discontinued secondary to his hemoptysis and CT findings.  Thoracic surgeon Dr. Montes was consulted which ordered a repeat CT on 9/29.  Patient still did complain of chest wall pain, and pain over chest tube sites and has been having productive cough with hemoptysis.  No fever, or chills.  Patient placed on 4L NC O2.  Patient also having intermittent nausea and poor appetite.  No melena, or rectal bleeding noted.  Patient is complaining of malaise and fatigue.  Bilateral chest tube is on water seal as of 9/30.  Patient transferred into the surgical floor for further evaluation and monitoring.

## 2021-10-01 NOTE — PROGRESS NOTES
Patient arrived to the floor from the ICU with 2 bags of belongings; one bag of personal items from home, and one bag with items from the ICU (US, kleenex, etc). Patient has his cell phone and cell phone , patient did not bring in home medications, patient denies any money/wallet/credit cards, has no items in security

## 2021-10-01 NOTE — CARE PLAN
The patient is Stable - Low risk of patient condition declining or worsening    Shift Goals  Clinical Goals: Pulmonary toileting, pain control, rest, advance diet  Patient Goals: Pain control, advance diet, rest  Family Goals: No family present    Problem: Knowledge Deficit - Standard  Goal: Patient and family/care givers will demonstrate understanding of plan of care, disease process/condition, diagnostic tests and medications  Outcome: Progressing     Problem: Pain - Standard  Goal: Alleviation of pain or a reduction in pain to the patient’s comfort goal  Outcome: Progressing     Problem: Skin Integrity  Goal: Skin integrity is maintained or improved  Outcome: Progressing     Problem: Fall Risk  Goal: Patient will remain free from falls  Outcome: Progressing       Progress made toward(s) clinical / shift goals:      Pain being managed with morphine PCA pump. Pt understands how to correctly use the PCA pump. Patient can reposition himself in bed and sit up at the edge of bed independently. Patient does not attempt to get up without assistance. Fall education provided and patient verbalizes understanding.     Patient is not progressing towards the following goals:

## 2021-10-01 NOTE — PROGRESS NOTES
4 Eyes Skin Assessment Completed by iLsandra RN and Jo RN      Head WDL  Ears WDL  Nose WDL  Mouth WDL  Neck WDL  Breast/Chest Incision - chest tube sites  Shoulder Blades WDL  Spine WDL  (R) Arm/Elbow/Hand WDL  (L) Arm/Elbow/Hand Edema  Abdomen WDL  Groin WDL  Scrotum/Coccyx/Buttocks Redness blanching  (R) Leg WDL  (L) Leg WDL  (R) Heel/Foot/Toe WDL  (L) Heel/Foot/Toe WDL              Devices In Places: Pulse Ox, SCD's and Oxy Mask, bilateral chest tubes     Interventions In Place Pillows     Possible Skin Injury No     Pictures Uploaded Into Epic N/A  Wound Consult Placed N/A  RN Wound Prevention Protocol Ordered No

## 2021-10-01 NOTE — PROGRESS NOTES
[facility] Medicine Daily Progress Note    Date of Service  10/1/2021    Chief Complaint  Heriberto Boggs is a 31 y.o. male admitted 9/28/2021 with respiratory distress    Hospital Course  Mr. Boggs is a 31-year-old male with a PMHx of DM T2, asthma, who developed COVID-19 viral pneumonia approximately 5 weeks ago and has had multiple admissions since then.  Patient was transferred over from [facility] to [facility] for advance care.  Patient was initially in the emergency department and had multiple hospitalization over the past month for complications secondary to COVID-19 pneumonia.    Admission on 9/22-9/25 diagnosed him with lower extremity DVT and was discharged on Xarelto and oxygen supplementation.    On 9/26, patient developed sudden onset of bilateral severe chest pain with worsening dyspnea and severe hypoxia.  Patient presented back to the emergency department where he was noted to have bilateral pneumothoraces and underwent chest tube placement and was started on remdesivir, vancomycin, cefdinir, and Decadron.  Patient also started to have hemoptysis and CT of the chest revealed bilateral large pneumatoceles with blood attenuation on each side as well as residual pneumothoraces left greater than right which she was subsequently transferred to our facility for thoracic surgery evaluation.    Patient initially admitted to the intensive care unit where broad-spectrum antibiotics was continued.  Anticoagulation was also discontinued secondary to his hemoptysis and CT findings.  Thoracic surgeon Dr. Montes was consulted which ordered a repeat CT on 9/29.  Patient still did complain of chest wall pain, and pain over chest tube sites and has been having productive cough with hemoptysis.  No fever, or chills.  Patient placed on 4L NC O2.  Patient also having intermittent nausea and poor appetite.  No melena, or rectal bleeding noted.  Patient is complaining of malaise and fatigue.  Bilateral chest tube  is on water seal as of 9/30.  Patient transferred into the surgical floor for further evaluation and monitoring.      Interval Problem Update  -Patient seen and examined.  Patient reports feeling fatigue and malaise. Denies SOB, not in distress. B/L chest tube noted and on waterseal.  Patient updated in plan of care.  -Plan of care: Monitor chest tube output; left side is on waterseal; discussed case with pulmonologist Dr. Linn which she had discussed case with interventional radiology -at this time no drainage -pending further recommendations  -Disposition: Patient will need bilateral chest tube removed prior to discharge; recommendations from surgical and pulmonology team  -Lab work: Reviewed; unremarkable  -Imaging: CT -reviewed  -VSS at this time    I have personally seen and examined the patient at bedside. I discussed the plan of care with patient and bedside RN.    Consultants/Specialty  pulmonary and thoracic surgery    Code Status  Full Code    Disposition  Patient is not medically cleared.   Anticipate discharge to to home with close outpatient follow-up.  I have placed the appropriate orders for post-discharge needs.    Review of Systems  Review of Systems   Constitutional: Positive for malaise/fatigue. Negative for chills and fever.   HENT: Negative.    Eyes: Negative.    Respiratory: Positive for cough and shortness of breath.    Cardiovascular: Negative.    Gastrointestinal: Negative.    Genitourinary: Negative.    Musculoskeletal: Negative.    Skin: Negative.    Neurological: Negative.    Endo/Heme/Allergies: Negative.    Psychiatric/Behavioral: Negative.         Physical Exam  Temp:  [36.4 °C (97.6 °F)-36.9 °C (98.5 °F)] 36.9 °C (98.5 °F)  Pulse:  [] 100  Resp:  [16-36] 18  BP: (109-136)/(58-93) 110/68  SpO2:  [93 %-99 %] 94 %    Physical Exam  Vitals and nursing note reviewed.   HENT:      Head: Normocephalic.      Nose: Nose normal.      Mouth/Throat:      Mouth: Mucous membranes are moist.    Eyes:      Pupils: Pupils are equal, round, and reactive to light.   Cardiovascular:      Rate and Rhythm: Regular rhythm. Tachycardia present.      Pulses: Normal pulses.      Heart sounds: Normal heart sounds.   Pulmonary:      Breath sounds: Rales present.   Chest:      Chest wall: Tenderness present.   Abdominal:      General: Bowel sounds are normal.      Palpations: Abdomen is soft.       Musculoskeletal:         General: Tenderness present.      Cervical back: Normal range of motion and neck supple.   Skin:     General: Skin is dry.      Capillary Refill: Capillary refill takes 2 to 3 seconds.   Neurological:      Mental Status: He is alert. Mental status is at baseline.         Fluids    Intake/Output Summary (Last 24 hours) at 10/1/2021 1134  Last data filed at 10/1/2021 0711  Gross per 24 hour   Intake 150 ml   Output 2388 ml   Net -2238 ml       Laboratory  Recent Labs     09/29/21  0500 09/30/21  0510 10/01/21  0417   WBC 22.9* 13.8* 8.8   RBC 3.79* 3.07* 2.93*   HEMOGLOBIN 11.1* 8.9* 8.4*   HEMATOCRIT 34.5* 27.8* 26.0*   MCV 91.0 90.6 88.7   MCH 29.3 29.0 28.7   MCHC 32.2* 32.0* 32.3*   RDW 46.9 46.3 45.1   PLATELETCT 480* 470* 470*   MPV 9.2 9.1 9.1     Recent Labs     09/30/21  0510 09/30/21  1549 10/01/21  0417   SODIUM 131* 136 136   POTASSIUM 4.0 4.3 4.2   CHLORIDE 98 100 100   CO2 24 29 29   GLUCOSE 167* 135* 152*   BUN 16 11 11   CREATININE 0.62 0.54 0.57   CALCIUM 7.3* 8.8 8.2*                   Imaging  DX-CHEST-LIMITED (1 VIEW)   Final Result      1.  Unchanged BILATERAL pulmonary opacities which may reflect a combination of airspace disease and blood or other fluid within pneumatoceles. Necrotizing pneumonia is also a consideration.   2.  Small LEFT pneumothorax, unchanged      DX-CHEST-LIMITED (1 VIEW)   Final Result         Slightly improved airspace opacity in the right lung.         DX-CHEST-LIMITED (1 VIEW)   Final Result         No significant interval change. Left chest tube is in  the left basilar hemithorax.      CT-CHEST (THORAX) WITH   Final Result      1.  Trace right and small left pneumothorax.   2.  There is a large left loculated high density fluid collection, may be consolidated blood in a pneumatocele.   3.  There is a left chest tube with the tip in the pericardial fat. There is associated subcutaneous emphysema.   4.  There is a loculated high density right collection located posteriorly, similar in appearance to the collection on the left. There is a right pleural space drainage catheter with tip in the superior anterior pleura.   5.  Extensive mixed groundglass and consolidative bilateral pulmonary opacities.      Findings communicated with Dr. Fields by Dr. Verde at 4:41 PM 9/29/2021               DX-CHEST-LIMITED (1 VIEW)   Final Result      No significant interval change.      OUTSIDE IMAGES-US VASCULAR   Final Result      OUTSIDE IMAGES-DX CHEST   Final Result      CT-FOREIGN FILM CAT SCAN   Final Result      OUTSIDE IMAGES-DX CHEST   Final Result      OUTSIDE IMAGES-DX CHEST   Final Result      OUTSIDE IMAGES-DX CHEST   Final Result      OUTSIDE IMAGES-DX CHEST   Final Result      OUTSIDE IMAGES-DX CHEST   Final Result      OUTSIDE IMAGES-DX CHEST   Final Result      CT-FOREIGN FILM CAT SCAN   Final Result      DX-CHEST-LIMITED (1 VIEW)   Final Result         No significant interval change. Small left apical pneumothorax.      DX-CHEST-PORTABLE (1 VIEW)   Final Result         Left apical pneumothorax is seen. There is a bulging elliptical opacity in the left hemithorax which could relate to loculated fluid or hematoma.      CRITICAL RESULT READ BACK: Preliminary findings discussed with and critical read back performed by Dr. RADHA TAPIA JR via telephone on 9/28/2021 10:58 PM           Assessment/Plan  * Pneumonia due to COVID-19 virus- (present on admission)  Assessment & Plan  MRSA PCR negative  Vancomycin discontinued  Continue cefepime  Continue close clinical  monitoring  Previously treated with Decadron remdesivir and barcitinib    DVT (deep venous thrombosis) (HCC)- (present on admission)  Assessment & Plan  Patient Xarelto has been on hold given hemoptysis and concern for bleeding in his pneumatoceles.  If hemoglobin is stable consider resuming anticoagulation with heparin drip when bleeding risk felt to be acceptable by pulmonary discussed with Dr Best who will discuss with Dr Linn    Anemia associated with acute blood loss- (present on admission)  Assessment & Plan  Monitor hemoglobin and transfuse if less than 7      Thrombocytosis- (present on admission)  Assessment & Plan  Likely reactive monitor CBC    Type 2 diabetes mellitus without complication, without long-term current use of insulin (HCC)- (present on admission)  Assessment & Plan  Insulin sliding scale monitor CBGs.  Recent Labs     09/28/21  2306 09/29/21  0506 09/29/21  1154 09/29/21  1738 09/29/21  2342 09/30/21  0552 09/30/21  1129   POCGLUCOSE 168* 153* 142* 166* 121* 134* 179*        Check hemoglobin A1c    Pneumatocele of lung- (present on admission)  Assessment & Plan  Pulmonary and thoracic surgery following  Dr. Montes does not feel that any surgical intervention is indicated at this time  Continue current antibiotics and monitor chest tube output      Bilateral pneumothoraces- (present on admission)  Assessment & Plan  Chest tube management per surgery      Acute respiratory failure with hypoxia (HCC)- (present on admission)  Assessment & Plan  Improved oxygen requirements  Chest tube management per surgery  RT protocol         VTE prophylaxis: enoxaparin ppx    I have performed a physical exam and reviewed and updated ROS and Plan today (10/1/2021). In review of yesterday's note (9/30/2021), there are no changes except as documented above.    ==========================================================================================================  Please note that this dictation was created  using voice recognition software. I have made every reasonable attempt to correct obvious errors, but there may be errors of grammar and possibly content that I did not discover before finalizing the note.    Electronically signed by:  JOSE MIGUEL Narvaez, MSN, APRN, FNP-C  Hospitalist Services  [facility]  [phone]  [email or web address]  10/01/21     [id number]

## 2021-10-01 NOTE — CARE PLAN
Problem: Pain - Standard  Goal: Alleviation of pain or a reduction in pain to the patient’s comfort goal  Outcome: Progressing     Problem: Skin Integrity  Goal: Skin integrity is maintained or improved  Outcome: Progressing     Problem: Fall Risk  Goal: Patient will remain free from falls  Outcome: Progressing   The patient is Stable - Low risk of patient condition declining or worsening    Shift Goals  Clinical Goals: pain control, IS, mobilization  Patient Goals: Pain control  Family Goals: NA    Progress made toward(s) clinical / shift goals:  pain control, mobilization    Patient is not progressing towards the following goals:

## 2021-10-02 ENCOUNTER — APPOINTMENT (OUTPATIENT)
Dept: RADIOLOGY | Facility: MEDICAL CENTER | Age: 31
DRG: 199 | End: 2021-10-02
Attending: STUDENT IN AN ORGANIZED HEALTH CARE EDUCATION/TRAINING PROGRAM
Payer: COMMERCIAL

## 2021-10-02 ENCOUNTER — APPOINTMENT (OUTPATIENT)
Dept: RADIOLOGY | Facility: MEDICAL CENTER | Age: 31
DRG: 199 | End: 2021-10-02
Attending: INTERNAL MEDICINE
Payer: COMMERCIAL

## 2021-10-02 ENCOUNTER — APPOINTMENT (OUTPATIENT)
Dept: RADIOLOGY | Facility: MEDICAL CENTER | Age: 31
DRG: 199 | End: 2021-10-02
Attending: NURSE PRACTITIONER
Payer: COMMERCIAL

## 2021-10-02 LAB
ANION GAP SERPL CALC-SCNC: 7 MMOL/L (ref 7–16)
BASOPHILS # BLD AUTO: 0.3 % (ref 0–1.8)
BASOPHILS # BLD: 0.03 K/UL (ref 0–0.12)
BUN SERPL-MCNC: 11 MG/DL (ref 8–22)
CALCIUM SERPL-MCNC: 8.6 MG/DL (ref 8.5–10.5)
CHLORIDE SERPL-SCNC: 99 MMOL/L (ref 96–112)
CO2 SERPL-SCNC: 29 MMOL/L (ref 20–33)
CREAT SERPL-MCNC: 0.63 MG/DL (ref 0.5–1.4)
EKG IMPRESSION: NORMAL
EOSINOPHIL # BLD AUTO: 0.13 K/UL (ref 0–0.51)
EOSINOPHIL NFR BLD: 1.4 % (ref 0–6.9)
ERYTHROCYTE [DISTWIDTH] IN BLOOD BY AUTOMATED COUNT: 46.9 FL (ref 35.9–50)
GLUCOSE BLD-MCNC: 138 MG/DL (ref 65–99)
GLUCOSE BLD-MCNC: 171 MG/DL (ref 65–99)
GLUCOSE SERPL-MCNC: 147 MG/DL (ref 65–99)
HCT VFR BLD AUTO: 27.3 % (ref 42–52)
HGB BLD-MCNC: 8.7 G/DL (ref 14–18)
IMM GRANULOCYTES # BLD AUTO: 0.08 K/UL (ref 0–0.11)
IMM GRANULOCYTES NFR BLD AUTO: 0.9 % (ref 0–0.9)
LYMPHOCYTES # BLD AUTO: 1.12 K/UL (ref 1–4.8)
LYMPHOCYTES NFR BLD: 12.3 % (ref 22–41)
MAGNESIUM SERPL-MCNC: 2.1 MG/DL (ref 1.5–2.5)
MCH RBC QN AUTO: 29.2 PG (ref 27–33)
MCHC RBC AUTO-ENTMCNC: 31.9 G/DL (ref 33.7–35.3)
MCV RBC AUTO: 91.6 FL (ref 81.4–97.8)
MONOCYTES # BLD AUTO: 0.9 K/UL (ref 0–0.85)
MONOCYTES NFR BLD AUTO: 9.9 % (ref 0–13.4)
NEUTROPHILS # BLD AUTO: 6.84 K/UL (ref 1.82–7.42)
NEUTROPHILS NFR BLD: 75.2 % (ref 44–72)
NRBC # BLD AUTO: 0 K/UL
NRBC BLD-RTO: 0 /100 WBC
PLATELET # BLD AUTO: 460 K/UL (ref 164–446)
PMV BLD AUTO: 9.2 FL (ref 9–12.9)
POTASSIUM SERPL-SCNC: 4.2 MMOL/L (ref 3.6–5.5)
RBC # BLD AUTO: 2.98 M/UL (ref 4.7–6.1)
SODIUM SERPL-SCNC: 135 MMOL/L (ref 135–145)
WBC # BLD AUTO: 9.1 K/UL (ref 4.8–10.8)

## 2021-10-02 PROCEDURE — 770006 HCHG ROOM/CARE - MED/SURG/GYN SEMI*

## 2021-10-02 PROCEDURE — 05HD33Z INSERTION OF INFUSION DEVICE INTO RIGHT CEPHALIC VEIN, PERCUTANEOUS APPROACH: ICD-10-PCS | Performed by: INTERNAL MEDICINE

## 2021-10-02 PROCEDURE — 71045 X-RAY EXAM CHEST 1 VIEW: CPT

## 2021-10-02 PROCEDURE — 700105 HCHG RX REV CODE 258: Performed by: EMERGENCY MEDICINE

## 2021-10-02 PROCEDURE — 36415 COLL VENOUS BLD VENIPUNCTURE: CPT

## 2021-10-02 PROCEDURE — A9270 NON-COVERED ITEM OR SERVICE: HCPCS | Performed by: INTERNAL MEDICINE

## 2021-10-02 PROCEDURE — 85025 COMPLETE CBC W/AUTO DIFF WBC: CPT

## 2021-10-02 PROCEDURE — 76937 US GUIDE VASCULAR ACCESS: CPT

## 2021-10-02 PROCEDURE — 82962 GLUCOSE BLOOD TEST: CPT

## 2021-10-02 PROCEDURE — 93010 ELECTROCARDIOGRAM REPORT: CPT | Performed by: INTERNAL MEDICINE

## 2021-10-02 PROCEDURE — 80048 BASIC METABOLIC PNL TOTAL CA: CPT

## 2021-10-02 PROCEDURE — 83735 ASSAY OF MAGNESIUM: CPT

## 2021-10-02 PROCEDURE — 93005 ELECTROCARDIOGRAM TRACING: CPT | Performed by: NURSE PRACTITIONER

## 2021-10-02 PROCEDURE — 99233 SBSQ HOSP IP/OBS HIGH 50: CPT | Mod: GC | Performed by: INTERNAL MEDICINE

## 2021-10-02 PROCEDURE — 700102 HCHG RX REV CODE 250 W/ 637 OVERRIDE(OP): Performed by: INTERNAL MEDICINE

## 2021-10-02 PROCEDURE — 700111 HCHG RX REV CODE 636 W/ 250 OVERRIDE (IP): Performed by: EMERGENCY MEDICINE

## 2021-10-02 PROCEDURE — 99233 SBSQ HOSP IP/OBS HIGH 50: CPT | Performed by: NURSE PRACTITIONER

## 2021-10-02 PROCEDURE — 700111 HCHG RX REV CODE 636 W/ 250 OVERRIDE (IP): Performed by: INTERNAL MEDICINE

## 2021-10-02 PROCEDURE — 700105 HCHG RX REV CODE 258: Performed by: INTERNAL MEDICINE

## 2021-10-02 RX ADMIN — INSULIN HUMAN 2 UNITS: 100 INJECTION, SOLUTION PARENTERAL at 00:28

## 2021-10-02 RX ADMIN — DOCUSATE SODIUM 50 MG AND SENNOSIDES 8.6 MG 2 TABLET: 8.6; 5 TABLET, FILM COATED ORAL at 18:03

## 2021-10-02 RX ADMIN — CEFEPIME 2 G: 2 INJECTION, POWDER, FOR SOLUTION INTRAVENOUS at 17:51

## 2021-10-02 RX ADMIN — CEFEPIME 2 G: 2 INJECTION, POWDER, FOR SOLUTION INTRAVENOUS at 04:13

## 2021-10-02 RX ADMIN — BISACODYL 10 MG: 10 SUPPOSITORY RECTAL at 00:45

## 2021-10-02 RX ADMIN — ENOXAPARIN SODIUM 40 MG: 40 INJECTION SUBCUTANEOUS at 04:13

## 2021-10-02 RX ADMIN — DOCUSATE SODIUM 50 MG AND SENNOSIDES 8.6 MG 2 TABLET: 8.6; 5 TABLET, FILM COATED ORAL at 04:14

## 2021-10-02 RX ADMIN — MORPHINE SULFATE: 50 INJECTION, SOLUTION, CONCENTRATE INTRAVENOUS at 00:41

## 2021-10-02 ASSESSMENT — ENCOUNTER SYMPTOMS
WHEEZING: 0
BRUISES/BLEEDS EASILY: 0
FOCAL WEAKNESS: 0
FEVER: 0
DIAPHORESIS: 0
COUGH: 1
HEMOPTYSIS: 0
DEPRESSION: 0
MUSCULOSKELETAL NEGATIVE: 1
SHORTNESS OF BREATH: 1
PSYCHIATRIC NEGATIVE: 1
DOUBLE VISION: 0
MYALGIAS: 0
SPUTUM PRODUCTION: 0
EYES NEGATIVE: 1
NERVOUS/ANXIOUS: 0
GASTROINTESTINAL NEGATIVE: 1
HEADACHES: 0
NEUROLOGICAL NEGATIVE: 1
BLURRED VISION: 0
PALPITATIONS: 0
DIARRHEA: 0
CARDIOVASCULAR NEGATIVE: 1
SORE THROAT: 0
WEAKNESS: 1
CHILLS: 0
NAUSEA: 0
CONSTIPATION: 0
VOMITING: 0

## 2021-10-02 ASSESSMENT — PAIN DESCRIPTION - PAIN TYPE: TYPE: ACUTE PAIN

## 2021-10-02 NOTE — PROGRESS NOTES
[facility] Medicine Daily Progress Note    Date of Service  10/2/2021    Chief Complaint  Heriberto Boggs is a 31 y.o. male admitted 9/28/2021 with respiratory distress    Hospital Course  Mr. Boggs is a 31-year-old male with a PMHx of DM T2, asthma, who developed COVID-19 viral pneumonia approximately 5 weeks ago and has had multiple admissions since then.  Patient was transferred over from [facility] to [facility] for advance care.  Patient was initially in the emergency department and had multiple hospitalization over the past month for complications secondary to COVID-19 pneumonia.    Admission on 9/22-9/25 diagnosed him with lower extremity DVT and was discharged on Xarelto and oxygen supplementation.    On 9/26, patient developed sudden onset of bilateral severe chest pain with worsening dyspnea and severe hypoxia.  Patient presented back to the emergency department where he was noted to have bilateral pneumothoraces and underwent chest tube placement and was started on remdesivir, vancomycin, cefdinir, and Decadron.  Patient also started to have hemoptysis and CT of the chest revealed bilateral large pneumatoceles with blood attenuation on each side as well as residual pneumothoraces left greater than right which she was subsequently transferred to our facility for thoracic surgery evaluation.    Patient initially admitted to the intensive care unit where broad-spectrum antibiotics was continued.  Anticoagulation was also discontinued secondary to his hemoptysis and CT findings.  Thoracic surgeon Dr. Montes was consulted which ordered a repeat CT on 9/29.  Patient still did complain of chest wall pain, and pain over chest tube sites and has been having productive cough with hemoptysis.  No fever, or chills.  Patient placed on 4L NC O2.  Patient also having intermittent nausea and poor appetite.  No melena, or rectal bleeding noted.  Patient is complaining of malaise and fatigue.  Bilateral chest tube  is on water seal as of 9/30.  Patient transferred into the surgical floor for further evaluation and monitoring.      Interval Problem Update  10/1/2021  -Patient seen and examined.  Patient reports feeling fatigue and malaise. Denies SOB, not in distress. B/L chest tube noted and on waterseal.  Patient updated in plan of care.  -Plan of care: Monitor chest tube output; left side is on waterseal; discussed case with pulmonologist Dr. Linn which she had discussed case with interventional radiology -at this time no drainage -pending further recommendations  -Disposition: Patient will need bilateral chest tube removed prior to discharge; recommendations from surgical and pulmonology team  -Lab work: Reviewed; unremarkable  -Imaging: CT -reviewed  -VSS at this time    10/2/2021  -Patient seen and examined.  Patient noted to be on sinus tachycardia overnight with HR reaching up to the 150s, but also had an episode of bradycardia with HR down to 26.  Repeat EKG noted sinus tach.  Patient also reports feeling very tired and have severe shortness of breath when patient pulls off the mask.  Patient saturation down to 60% when his oxygen mask is off.  Patient sitting up on chair with some tolerance.  Discussed with patient plan of care in conjunction with pulmonology.  Patient updated in plan of care.  -Plan of care: Left chest tube discontinued on 10/1 by pulmonology team -monitor site; right chest tube on waterseal -monitor output; continue to encourage patient to work with respiratory therapy; pulmonology Dr. Linn following; thoracic surgeon Dr. Montes following.  Get updated EKG due to cardiac rhythm changes overnight; place patient on telemetry monitoring; midline orders placed due to lack of access.  -Disposition: Pending chest tube removal and further recommendations from pulmonology and thoracic surgery  -Lab work: Reviewed; unremarkable  -Imaging: Chest x-ray -right chest tube seen, unchanged airspace opacity in  right lung, unchanged bulging elliptical opacity in left hemithorax, unchanged tiny left apical pneumothorax, stable cardiopericardial silhouette  -VSS at this time      I have personally seen and examined the patient at bedside. I discussed the plan of care with patient and bedside RN.    Consultants/Specialty  pulmonary and thoracic surgery    Code Status  Full Code    Disposition  Patient is not medically cleared.   Anticipate discharge to to home with close outpatient follow-up.  I have placed the appropriate orders for post-discharge needs.    Review of Systems  Review of Systems   Constitutional: Positive for malaise/fatigue. Negative for chills and fever.   HENT: Negative.    Eyes: Negative.    Respiratory: Positive for cough and shortness of breath.    Cardiovascular: Negative.    Gastrointestinal: Negative.    Genitourinary: Negative.    Musculoskeletal: Negative.    Skin: Negative.    Neurological: Negative.    Endo/Heme/Allergies: Negative.    Psychiatric/Behavioral: Negative.         Physical Exam  Temp:  [36.2 °C (97.2 °F)-37.7 °C (99.8 °F)] 37.3 °C (99.2 °F)  Pulse:  [105-120] 105  Resp:  [18-19] 18  BP: ()/(52-84) 134/79  SpO2:  [93 %-98 %] 95 %    Physical Exam  Vitals and nursing note reviewed.   HENT:      Head: Normocephalic.      Nose: Nose normal.      Mouth/Throat:      Mouth: Mucous membranes are moist.   Eyes:      Pupils: Pupils are equal, round, and reactive to light.   Cardiovascular:      Rate and Rhythm: Regular rhythm. Tachycardia present.      Pulses: Normal pulses.      Heart sounds: Normal heart sounds.   Pulmonary:      Breath sounds: Rales present.   Chest:      Chest wall: Tenderness present.   Abdominal:      General: Bowel sounds are normal.      Palpations: Abdomen is soft.      Tenderness: There is generalized abdominal tenderness.       Musculoskeletal:         General: Tenderness present.      Cervical back: Normal range of motion and neck supple.   Skin:     General:  Skin is dry.      Capillary Refill: Capillary refill takes 2 to 3 seconds.   Neurological:      Mental Status: He is alert. Mental status is at baseline.         Fluids    Intake/Output Summary (Last 24 hours) at 10/2/2021 1020  Last data filed at 10/2/2021 0905  Gross per 24 hour   Intake 540 ml   Output 1024 ml   Net -484 ml       Laboratory  Recent Labs     09/30/21  0510 10/01/21  0417 10/02/21  0408   WBC 13.8* 8.8 9.1   RBC 3.07* 2.93* 2.98*   HEMOGLOBIN 8.9* 8.4* 8.7*   HEMATOCRIT 27.8* 26.0* 27.3*   MCV 90.6 88.7 91.6   MCH 29.0 28.7 29.2   MCHC 32.0* 32.3* 31.9*   RDW 46.3 45.1 46.9   PLATELETCT 470* 470* 460*   MPV 9.1 9.1 9.2     Recent Labs     09/30/21  1549 10/01/21  0417 10/02/21  0408   SODIUM 136 136 135   POTASSIUM 4.3 4.2 4.2   CHLORIDE 100 100 99   CO2 29 29 29   GLUCOSE 135* 152* 147*   BUN 11 11 11   CREATININE 0.54 0.57 0.63   CALCIUM 8.8 8.2* 8.6                   Imaging  DX-CHEST-LIMITED (1 VIEW)   Final Result         No significant interval change.            DX-CHEST-PORTABLE (1 VIEW)   Final Result      1.  Unchanged bilateral pulmonary opacities.   2.  No pneumothorax is seen.      DX-CHEST-LIMITED (1 VIEW)   Final Result      1.  Unchanged BILATERAL pulmonary opacities which may reflect a combination of airspace disease and blood or other fluid within pneumatoceles. Necrotizing pneumonia is also a consideration.   2.  Small LEFT pneumothorax, unchanged      DX-CHEST-LIMITED (1 VIEW)   Final Result         Slightly improved airspace opacity in the right lung.         DX-CHEST-LIMITED (1 VIEW)   Final Result         No significant interval change. Left chest tube is in the left basilar hemithorax.      CT-CHEST (THORAX) WITH   Final Result      1.  Trace right and small left pneumothorax.   2.  There is a large left loculated high density fluid collection, may be consolidated blood in a pneumatocele.   3.  There is a left chest tube with the tip in the pericardial fat. There is  associated subcutaneous emphysema.   4.  There is a loculated high density right collection located posteriorly, similar in appearance to the collection on the left. There is a right pleural space drainage catheter with tip in the superior anterior pleura.   5.  Extensive mixed groundglass and consolidative bilateral pulmonary opacities.      Findings communicated with Dr. Fields by Dr. Verde at 4:41 PM 9/29/2021               DX-CHEST-LIMITED (1 VIEW)   Final Result      No significant interval change.      OUTSIDE IMAGES-US VASCULAR   Final Result      OUTSIDE IMAGES-DX CHEST   Final Result      CT-FOREIGN FILM CAT SCAN   Final Result      OUTSIDE IMAGES-DX CHEST   Final Result      OUTSIDE IMAGES-DX CHEST   Final Result      OUTSIDE IMAGES-DX CHEST   Final Result      OUTSIDE IMAGES-DX CHEST   Final Result      OUTSIDE IMAGES-DX CHEST   Final Result      OUTSIDE IMAGES-DX CHEST   Final Result      CT-FOREIGN FILM CAT SCAN   Final Result      DX-CHEST-LIMITED (1 VIEW)   Final Result         No significant interval change. Small left apical pneumothorax.      DX-CHEST-PORTABLE (1 VIEW)   Final Result         Left apical pneumothorax is seen. There is a bulging elliptical opacity in the left hemithorax which could relate to loculated fluid or hematoma.      CRITICAL RESULT READ BACK: Preliminary findings discussed with and critical read back performed by Dr. RADHA TAPIA JR via telephone on 9/28/2021 10:58 PM      IR-MIDLINE CATHETER INSERTION WO GUIDANCE > AGE 3    (Results Pending)        Assessment/Plan  * Pneumonia due to COVID-19 virus- (present on admission)  Assessment & Plan  -MRSA PCR negative  -Vancomycin discontinued  -Continue cefepime  -Continue close clinical monitoring  -Previously treated with Decadron remdesivir and barcitinib    DVT (deep venous thrombosis) (HCC)- (present on admission)  Assessment & Plan  -Patient Xarelto has been on hold given hemoptysis and concern for bleeding in his  pneumatoceles.  -If hemoglobin is stable consider resuming anticoagulation with heparin drip when bleeding risk felt to be acceptable by pulmonary discussed with Dr Best who will discuss with Dr Linn    Anemia associated with acute blood loss- (present on admission)  Assessment & Plan  -Monitor hemoglobin and transfuse if less than 7      Thrombocytosis- (present on admission)  Assessment & Plan  Likely reactive monitor CBC    Type 2 diabetes mellitus without complication, without long-term current use of insulin (HCC)- (present on admission)  Assessment & Plan  Insulin sliding scale monitor CBGs.  Recent Labs     09/28/21  2306 09/29/21  0506 09/29/21  1154 09/29/21  1738 09/29/21  2342 09/30/21  0552 09/30/21  1129   POCGLUCOSE 168* 153* 142* 166* 121* 134* 179*        Check hemoglobin A1c    Pneumatocele of lung- (present on admission)  Assessment & Plan  -Pulmonary and thoracic surgery following  -Dr. Montes does not feel that any surgical intervention is indicated at this time  -Continue current antibiotics and monitor chest tube output  -Pulmonology team following  -Left chest tube discontinued on 10/1  -Right chest tube placed on waterseal on 10/1    Bilateral pneumothoraces- (present on admission)  Assessment & Plan  -Chest tube management per surgery  -Pulmonology, Dr. Linn consulted    Acute respiratory failure with hypoxia (HCC)- (present on admission)  Assessment & Plan  -Improved oxygen requirements  -Chest tube management per surgery  -RT protocol       VTE prophylaxis: enoxaparin ppx    I have performed a physical exam and reviewed and updated ROS and Plan today (10/2/2021). In review of yesterday's note (10/1/2021), there are no changes except as documented above.    ==========================================================================================================  Please note that this dictation was created using voice recognition software. I have made every reasonable attempt to  correct obvious errors, but there may be errors of grammar and possibly content that I did not discover before finalizing the note.    Electronically signed by:  JOSE MIGUEL Narvaez, MSN, APRN, FNP-C  Hospitalist Services  [facility]  [phone]  [email or web address]  10/02/21      1026

## 2021-10-02 NOTE — CONSULTS
Pulmonary Initial Consult H&P  Pulmonary Service, [facility]    Referring Physician: Himanshu Gilman    CC: COVID-19 pneumonia with bilateral pneumothoraces and pneumatoceles    HPI: Heriberto Boggs is a 31 y.o. man with past medical history remarkable for hypertension, hyperlipidemia, diet-controlled diabetes mellitus, and self-reported exercise-induced asthma (no PFTs on file) unvaccinated man who was transferred from [facility] in [facility].  Mr. Boggs contracted COVID-19 pneumonia 8/30 and was seen in the ED in [facility] 5 times for shortness of breath.  He was admitted September 22-25, discharged on Xarelto and with 3 L O2.  On September 26 he felt bilateral severe chest pain that was sudden onset.  At the time O2 saturation was down to 36%.  He was brought to the hospital and found to have large right pneumothorax and small left loculated pneumothorax.  Right-sided chest tube was placed at the time.  He was treated with remdesivir, vancomycin, cefdinir and dexamethasone.  He was transferred to [facility] 9/28.  Upon arrival CT chest was remarkable for bilateral large pneumatoceles attenuating blood.  Left chest tube was placed at the time.  Procol was elevated at the time to 1.4, MRSA negative by PCR.  With leukocytosis to 22.9.  On 9/28 left chest tube was found to be sitting on the pericardium and was withdrawn 2 cm.    Referral history: Patient reports feeling very weak and experiencing pain from insertion points of chest tube.    Review of systems: In addition to what is detailed in the HPI above, all other systems reviewed and are negative.    Past Medical History:    has no past medical history on file.    FHx:  family history is not on file.    SHx:   reports that he has never smoked. He has never used smokeless tobacco. He reports current alcohol use of about 1.2 oz of alcohol per week. He reports previous drug use.    Allergies:  Allergies   Allergen  "Reactions   • Lidocaine Rash     Patient states \"it gives me blisters where it touches\"   • Pineapple Itching       Medications:    Current Facility-Administered Medications:   •  enoxaparin (LOVENOX) inj 40 mg, 40 mg, Subcutaneous, DAILY, Kenn Best Jr., D.O., 40 mg at 10/01/21 0451  •  morphine PCA 1 mg/mL in 50 mL (PCA), , Intravenous, Continuous, Rate Verify at 10/01/21 0709 **AND** Pharmacy Consult Request ...Pain Management Review 1 Each, 1 Each, Other, PHARMACY TO DOSE, Álvaro Martinez M.D.  •  senna-docusate (PERICOLACE or SENOKOT S) 8.6-50 MG per tablet 2 Tablet, 2 Tablet, Oral, BID, 2 Tablet at 10/01/21 1752 **AND** polyethylene glycol/lytes (MIRALAX) PACKET 1 Packet, 1 Packet, Oral, QDAY PRN, 1 Packet at 09/29/21 1733 **AND** magnesium hydroxide (MILK OF MAGNESIA) suspension 30 mL, 30 mL, Oral, QDAY PRN, 30 mL at 09/30/21 0850 **AND** bisacodyl (DULCOLAX) suppository 10 mg, 10 mg, Rectal, QDAY PRN, Jeremy M Gonda, M.D.  •  Respiratory Therapy Consult, , Nebulization, Continuous RT, Jeremy M Gonda, M.D.  •  acetaminophen (Tylenol) tablet 650 mg, 650 mg, Oral, Q6HRS PRN, Jeremy M Gonda, M.D., 650 mg at 09/30/21 1837  •  ondansetron (ZOFRAN) syringe/vial injection 4 mg, 4 mg, Intravenous, Q4HRS PRN, Jeremy M Gonda, M.D., 4 mg at 09/30/21 1837  •  ondansetron (ZOFRAN ODT) dispertab 4 mg, 4 mg, Oral, Q4HRS PRN, Jeremy M Gonda, M.D.  •  promethazine (PHENERGAN) tablet 12.5-25 mg, 12.5-25 mg, Oral, Q4HRS PRN, Jeremy M Gonda, M.D.  •  promethazine (PHENERGAN) suppository 12.5-25 mg, 12.5-25 mg, Rectal, Q4HRS PRN, Jeremy M Gonda, M.D.  •  prochlorperazine (COMPAZINE) injection 5-10 mg, 5-10 mg, Intravenous, Q4HRS PRN, Jeremy M Gonda, M.D.  •  insulin regular (HumuLIN R,NovoLIN R) injection, 2-9 Units, Subcutaneous, Q6HRS, 2 Units at 10/01/21 1159 **AND** POC blood glucose manual result, , , Q6H **AND** NOTIFY MD and PharmD, , , Once **AND** glucose 4 g chewable tablet 16 g, 16 g, Oral, Q15 MIN PRN **AND** " dextrose 50% (D50W) injection 50 mL, 50 mL, Intravenous, Q15 MIN PRN, Jeremy M Gonda, M.D.  •  cefepime (Maxipime) 2 g in  mL IVPB, 2 g, Intravenous, Q12HRS, Jeremy M Gonda, M.D., Last Rate: 200 mL/hr at 10/01/21 1752, 2 g at 10/01/21 1752  •  benzocaine-menthol (CEPACOL) lozenge 1 Lozenge, 1 Lozenge, Mouth/Throat, Q2HRS PRN, Jeremy M Gonda, M.D., 1 Lozenge at 09/29/21 0836  •  guaiFENesin (ROBITUSSIN) 100 MG/5ML solution 200 mg, 10 mL, Oral, Q4HRS PRN, Jeremy M Gonda, M.D., 200 mg at 09/29/21 1030    Physical Examination:     Vitals:    10/01/21 0355 10/01/21 0711 10/01/21 1159 10/01/21 1603   BP: 136/93 110/68 100/52 124/70   Pulse: 82 100 (!) 120 (!) 108   Resp: 16 18 18 18   Temp: 36.9 °C (98.5 °F) 36.9 °C (98.5 °F) 37.2 °C (99 °F) 36.2 °C (97.2 °F)   TempSrc: Temporal Temporal Temporal Temporal   SpO2: 93% 94% 96% 97%   Weight:       Height:           General: Young man laying in bed in no acute distress  HEENT: NC/AT.  PERRLA, EOMI.  External ear normal.  Nares patent, nonedematous nonerythematous.  Moist mucous membranes. Good dentition.  Trachea midline.   Neck: No JVP.  Carotid bruit could not be appreciated.  Nontender, nonnodular thyroid.  No anterior or posterior cervical, occipital, supraclavicular lymphadenopathy  Cardiovascular: PMI<2 cm at fifth costal space at midclavicular line.  No heaves appreciated.  No thrills.  RRR W/O M, R, G.  Pulmonary: Normal work of breathing.  Resonant to percussion.  Crackles heard over lung bases bilaterally.  Abdomen: Obese, soft, nondistended.  Hypoactive bowel sounds.  Nontender to percussion or palpation.  No hepatosplenomegaly noted.  No fluid wave  Musculoskeletal: Normal tone and bulk.  Full ROM.  Skin: Warm and dry.  No rashes, bruises or lacerations noted  Neuro: Alert and oriented X4.  CN II-XII checked and intact.  5 out of 5 strength throughout.  DTR 2+ throughout.  FTN, HTS intact.  Sensation intact . negative Romberg.  Lymphatic: No edema or  lymphadenopathy noted.  Psychiatric: Good mood congruent affect.  Thought form linear, content appropriate    Objective Data:    Labs:  No results found for: PROTHROMBTM, INR   Lab Results   Component Value Date/Time    WBC 8.8 10/01/2021 04:17 AM    RBC 2.93 (L) 10/01/2021 04:17 AM    HEMOGLOBIN 8.4 (L) 10/01/2021 04:17 AM    HEMATOCRIT 26.0 (L) 10/01/2021 04:17 AM    MCV 88.7 10/01/2021 04:17 AM    MCH 28.7 10/01/2021 04:17 AM    MCHC 32.3 (L) 10/01/2021 04:17 AM    MPV 9.1 10/01/2021 04:17 AM    NEUTSPOLYS 71.30 10/01/2021 04:17 AM    LYMPHOCYTES 16.10 (L) 10/01/2021 04:17 AM    MONOCYTES 9.90 10/01/2021 04:17 AM    EOSINOPHILS 1.80 10/01/2021 04:17 AM    BASOPHILS 0.20 10/01/2021 04:17 AM      Lab Results   Component Value Date/Time    SODIUM 136 10/01/2021 04:17 AM    POTASSIUM 4.2 10/01/2021 04:17 AM    CHLORIDE 100 10/01/2021 04:17 AM    CO2 29 10/01/2021 04:17 AM    GLUCOSE 152 (H) 10/01/2021 04:17 AM    BUN 11 10/01/2021 04:17 AM    CREATININE 0.57 10/01/2021 04:17 AM      No results found for: CHOLSTRLTOT, LDL, HDL, TRIGLYCERIDE    No results found for: ALKPHOSPHAT, ASTSGOT, ALTSGPT, TBILIRUBIN     Imaging/Testing:    I interpreted and/or reviewed the patient's     DX-CHEST-PORTABLE (1 VIEW)   Final Result      1.  Unchanged bilateral pulmonary opacities.   2.  No pneumothorax is seen.      DX-CHEST-LIMITED (1 VIEW)   Final Result      1.  Unchanged BILATERAL pulmonary opacities which may reflect a combination of airspace disease and blood or other fluid within pneumatoceles. Necrotizing pneumonia is also a consideration.   2.  Small LEFT pneumothorax, unchanged      DX-CHEST-LIMITED (1 VIEW)   Final Result         Slightly improved airspace opacity in the right lung.         DX-CHEST-LIMITED (1 VIEW)   Final Result         No significant interval change. Left chest tube is in the left basilar hemithorax.      CT-CHEST (THORAX) WITH   Final Result      1.  Trace right and small left pneumothorax.   2.   There is a large left loculated high density fluid collection, may be consolidated blood in a pneumatocele.   3.  There is a left chest tube with the tip in the pericardial fat. There is associated subcutaneous emphysema.   4.  There is a loculated high density right collection located posteriorly, similar in appearance to the collection on the left. There is a right pleural space drainage catheter with tip in the superior anterior pleura.   5.  Extensive mixed groundglass and consolidative bilateral pulmonary opacities.      Findings communicated with Dr. Fields by Dr. Verde at 4:41 PM 9/29/2021               DX-CHEST-LIMITED (1 VIEW)   Final Result      No significant interval change.      OUTSIDE IMAGES-US VASCULAR   Final Result      OUTSIDE IMAGES-DX CHEST   Final Result      CT-FOREIGN FILM CAT SCAN   Final Result      OUTSIDE IMAGES-DX CHEST   Final Result      OUTSIDE IMAGES-DX CHEST   Final Result      OUTSIDE IMAGES-DX CHEST   Final Result      OUTSIDE IMAGES-DX CHEST   Final Result      OUTSIDE IMAGES-DX CHEST   Final Result      OUTSIDE IMAGES-DX CHEST   Final Result      CT-FOREIGN FILM CAT SCAN   Final Result      DX-CHEST-LIMITED (1 VIEW)   Final Result         No significant interval change. Small left apical pneumothorax.      DX-CHEST-PORTABLE (1 VIEW)   Final Result         Left apical pneumothorax is seen. There is a bulging elliptical opacity in the left hemithorax which could relate to loculated fluid or hematoma.      CRITICAL RESULT READ BACK: Preliminary findings discussed with and critical read back performed by Dr. RADHA TAPIA JR via telephone on 9/28/2021 10:58 PM          Assessment and Plan:    Heriberto Boggs is a 31 y.o. man with past medical history remarkable for hypertension, hyperlipidemia, diet-controlled diabetes mellitus, and self-reported exercise-induced asthma (no PFTs on file) unvaccinated man who was transferred to [facility] in the setting of bilateral  pneumothoraces and pneumatoceles status post COVID-19 pneumonia.  Review of his CT is remarkable for chest tube inserted into the pericardial fat.  After discussion with Dr. Denny from interventional radiology, this was removed using appropriate technique and sealing with petroleum gauze 10/1/2021.  Chest x-ray was ordered and remarkable opacities consistent with locations of pneumatocele seen on CT.  Status post removal of chest tube, no new pneumothorax was seen.    Plan:  #Bilateral pneumatoceles, likely infected secondary to  #COVID-19 pneumonia (resolved)  *Patient status post multiple rounds of dexamethasone as well as course of remdesivir and baricitinib.  He is no longer infectious and was removed from isolation 9/29 by ID.  -Agree with holding anticoagulation in the setting of blood attenuating pneumatoceles  -Follow LFTs daily  -Agree with cefepime; no need for vancomycin in the setting of negative MRSA (9/28)  -Patient was seen by Dr. Montes with cardiothoracic surgery who recommended chest tube to waterseal and medical management.  -CXR pending for 10/2/2021    Pulmonary will continue to follow    The evaluation of the patient, and recommended management, was discussed with the primary team

## 2021-10-02 NOTE — PROGRESS NOTES
Pulmonary Progress Note    Date of Service: 10/2/2021    Date of Admission:  9/28/2021  9:31 PM    Chief Complaint: Sudden severe bilateral chest pain with worsening shortness of breath.    History of Present Illness/Hospital Course: Heriberto Boggs is a 31 y.o. male with a past medical history of type 2 diabetes, asthma, COVID-19 pneumonia about 5 weeks ago who was transferred from [facility] for higher level of care.  Patient has had multiple admissions over the past month for complications secondary to his previous pneumonia.  Most recent hospitalization from 9/22-9/25 was complicated by DVT and patient was discharged on Xarelto as well as 3 L of oxygen.  On 9/26 patient noted severe and sudden onset bilateral chest pain with worsening shortness of breath and presented himself to the emergency department in [place].  Patient was found to have bilateral pneumothoraces which required a bilateral chest tube placement.  And patient was being treated for recurrent Covid infection as well as bacterial superinfection.  Patient had subsequent development of hemoptysis and on chest CT was found to have bilateral pneumatoceles with blood attenuation.  Patient was then transferred to [facility] for cardiothoracic surgery consult.  Cardiothoracic surgery and interventional radiology will not be performing intervention and patient has been stable.  Left-sided chest tube was removed on 10/1, right-sided chest tube removed 10/2.    Mr. Boggs has had multiple episodes of left-sided chest pain described as sharp, as well as a dull ache that radiates to his left scapula.  Patient states that it is worse with inspiration.  States that this has been going on since admission.  He is aware that he has a pneumatocele in the left and right lung and states that it might be related to those fluid collections.  Patient was getting an EKG during exam.  Patient notes that he has been feeling unwell however notes that this has been the  case since his admission.  Cannot differentiate whether or not his current symptoms are worse than what has been feeling over the past few days.    Review of Systems   Constitutional: Positive for malaise/fatigue. Negative for chills, diaphoresis and fever.   HENT: Positive for congestion. Negative for sore throat.    Eyes: Negative for blurred vision and double vision.   Respiratory: Positive for cough and shortness of breath. Negative for hemoptysis, sputum production and wheezing.    Cardiovascular: Positive for chest pain (Left-sided, radiation to left scapula, reproducible chest pain at costosternal junction is separate from pain on inspiration.). Negative for palpitations.   Gastrointestinal: Negative for constipation, diarrhea, nausea and vomiting.   Genitourinary: Positive for dysuria (Denies pain however notes difficulty initiating urination.). Negative for hematuria.   Musculoskeletal: Negative for myalgias.   Skin: Negative for itching and rash.   Neurological: Positive for weakness. Negative for focal weakness and headaches.   Endo/Heme/Allergies: Negative for environmental allergies. Does not bruise/bleed easily.   Psychiatric/Behavioral: Negative for depression. The patient is not nervous/anxious.      Home Medications     Reviewed by Jazmin Jacobs R.N. (Registered Nurse) on 09/29/21 at 0833  Med List Status: Complete   Medication Last Dose Status        Patient Jesús Taking any Medications                     Social History     Tobacco Use   • Smoking status: Never Smoker   • Smokeless tobacco: Never Used   Vaping Use   • Vaping Use: Never used   Substance Use Topics   • Alcohol use: Yes     Alcohol/week: 1.2 oz     Types: 2 Shots of liquor per week     Comment: not even every month, very seldom (maybe one shot every 12 months)   • Drug use: Not Currently        History reviewed. No pertinent past medical history.    History reviewed. No pertinent surgical history.    Allergies: Lidocaine and  "Pineapple    History reviewed. No pertinent family history.    Pulmonary-Specific Vital Signs  Vitals  Blood Pressure: 135/67  Temperature: 36.9 °C (98.4 °F)  Temp src: Temporal  Pulse: (!) 120  Respiration: 18  Pulse Oximetry: 94 %  Height: 190.5 cm (6' 3\")  Weight: (!) 130 kg (285 lb 11.5 oz)    Physical Examination  Physical Exam  Constitutional:       General: He is not in acute distress.     Appearance: He is obese. He is ill-appearing. He is not diaphoretic.   HENT:      Head: Normocephalic and atraumatic.      Right Ear: External ear normal.      Left Ear: External ear normal.      Nose: Nose normal.      Mouth/Throat:      Mouth: Mucous membranes are moist.      Pharynx: Oropharynx is clear.   Eyes:      Extraocular Movements: Extraocular movements intact.      Pupils: Pupils are equal, round, and reactive to light.   Cardiovascular:      Rate and Rhythm: Regular rhythm. Tachycardia present.      Pulses: Normal pulses.      Heart sounds: Normal heart sounds. No murmur heard.   No gallop.    Pulmonary:      Comments: Bilateral crackles heard, no audible wheezes.  Symmetric chest rise, patient does have pain on inspiration, worse around chest tube site bilaterally.  Abdominal:      General: Bowel sounds are normal.      Palpations: Abdomen is soft.      Tenderness: There is abdominal tenderness.   Skin:     General: Skin is warm and dry.      Capillary Refill: Capillary refill takes less than 2 seconds.      Comments: No peripheral edema noted   Neurological:      General: No focal deficit present.      Mental Status: He is alert and oriented to person, place, and time. Mental status is at baseline.   Psychiatric:         Mood and Affect: Mood normal.         Behavior: Behavior normal.     Intake/Output Summary (Last 24 hours) at 10/2/2021 1131  Last data filed at 10/2/2021 0905  Gross per 24 hour   Intake 540 ml   Output 1024 ml   Net -484 ml       Recent Labs     09/30/21  0510 10/01/21  0417 10/02/21  0408 "   WBC 13.8* 8.8 9.1   NEUTSPOLYS 76.70* 71.30 75.20*   LYMPHOCYTES 11.20* 16.10* 12.30*   MONOCYTES 9.40 9.90 9.90   EOSINOPHILS 1.70 1.80 1.40   BASOPHILS 0.10 0.20 0.30     Recent Labs     09/30/21  0510 09/30/21  0510 09/30/21  1549 10/01/21  0417 10/02/21  0408   SODIUM 131*   < > 136 136 135   POTASSIUM 4.0   < > 4.3 4.2 4.2   CHLORIDE 98   < > 100 100 99   CO2 24   < > 29 29 29   BUN 16   < > 11 11 11   CREATININE 0.62   < > 0.54 0.57 0.63   MAGNESIUM 2.0  --   --  2.1 2.1   CALCIUM 7.3*   < > 8.8 8.2* 8.6    < > = values in this interval not displayed.     Recent Labs     09/30/21  1549 10/01/21  0417 10/02/21  0408   GLUCOSE 135* 152* 147*         DX-CHEST-LIMITED (1 VIEW)   Final Result         No significant interval change.            DX-CHEST-PORTABLE (1 VIEW)   Final Result      1.  Unchanged bilateral pulmonary opacities.   2.  No pneumothorax is seen.      DX-CHEST-LIMITED (1 VIEW)   Final Result      1.  Unchanged BILATERAL pulmonary opacities which may reflect a combination of airspace disease and blood or other fluid within pneumatoceles. Necrotizing pneumonia is also a consideration.   2.  Small LEFT pneumothorax, unchanged      DX-CHEST-LIMITED (1 VIEW)   Final Result         Slightly improved airspace opacity in the right lung.         DX-CHEST-LIMITED (1 VIEW)   Final Result         No significant interval change. Left chest tube is in the left basilar hemithorax.      CT-CHEST (THORAX) WITH   Final Result      1.  Trace right and small left pneumothorax.   2.  There is a large left loculated high density fluid collection, may be consolidated blood in a pneumatocele.   3.  There is a left chest tube with the tip in the pericardial fat. There is associated subcutaneous emphysema.   4.  There is a loculated high density right collection located posteriorly, similar in appearance to the collection on the left. There is a right pleural space drainage catheter with tip in the superior anterior  pleura.   5.  Extensive mixed groundglass and consolidative bilateral pulmonary opacities.      Findings communicated with Dr. Fields by Dr. Verde at 4:41 PM 9/29/2021               DX-CHEST-LIMITED (1 VIEW)   Final Result      No significant interval change.      OUTSIDE IMAGES-US VASCULAR   Final Result      OUTSIDE IMAGES-DX CHEST   Final Result      CT-FOREIGN FILM CAT SCAN   Final Result      OUTSIDE IMAGES-DX CHEST   Final Result      OUTSIDE IMAGES-DX CHEST   Final Result      OUTSIDE IMAGES-DX CHEST   Final Result      OUTSIDE IMAGES-DX CHEST   Final Result      OUTSIDE IMAGES-DX CHEST   Final Result      OUTSIDE IMAGES-DX CHEST   Final Result      CT-FOREIGN FILM CAT SCAN   Final Result      DX-CHEST-LIMITED (1 VIEW)   Final Result         No significant interval change. Small left apical pneumothorax.      DX-CHEST-PORTABLE (1 VIEW)   Final Result         Left apical pneumothorax is seen. There is a bulging elliptical opacity in the left hemithorax which could relate to loculated fluid or hematoma.      CRITICAL RESULT READ BACK: Preliminary findings discussed with and critical read back performed by Dr. RADHA TAPIA JR via telephone on 9/28/2021 10:58 PM      IR-MIDLINE CATHETER INSERTION WO GUIDANCE > AGE 3    (Results Pending)       Patient Active Problem List   Diagnosis   • Pneumonia due to COVID-19 virus   • Acute respiratory failure with hypoxia (HCC)   • Bilateral pneumothoraces   • Pneumatocele of lung   • Type 2 diabetes mellitus without complication, without long-term current use of insulin ([facility])   • Thrombocytosis   • Anemia associated with acute blood loss   • DVT (deep venous thrombosis) ([facility])     Assessment and Recommendations:  Mr. Boggs is a 31-year-old male with past medical history of type 2 diabetes, asthma, previous COVID-19 pneumonia who was admitted for bilateral pneumothoraces and subsequent pneumatoceles treated with bilateral chest tubes.    #Bilateral pneumothoraces  #Prior  COVID-19 pneumonia  #Sepsis  -Patient was noted to have bradycardia down to the 20s and subsequent tachycardia.  Patient has been between 120-130 all morning.  EKG indicates sinus tachycardia without any significant changes.  -Given patient's current fluid collections, and history of previous infections, infectious work-up should be considered.  -Strongly recommend repeating blood cultures, getting a procalcitonin, and repeat CBC.  -Continue antibiotics per primary team.  -Given the low fluid output of remaining chest tube, chest tube was removed today (10/2).  -Should repeat chest x-ray in the morning to monitor fluid collections.  -We will leave remaining care to primary team.  -Pulmonology to sign off.  Thanks for the consult.  Please reach out for any further questions.    Golden Dubois DO,    The patient was seen and plan discussed with my attending, Dr. Xiomara Love MD.

## 2021-10-02 NOTE — PROCEDURES
Vascular Access Team    Date of Insertion: October 2, 2021  Arm Circumference: n/a  Line Length: 10 cm  Line Size: 18 G  Vein Occupancy %: 23%  Reason for Midline: Access  Labs: WBC 9.1, , BUN 11, Cr 0.63, GFR >60, INR n/a    Orders confirmed, vessel patency confirmed with ultrasound. Risks and benefits of procedure explained to patient and education regarding line associated bloodstream infections provided. Questions answered.     PowerGlide Midline placed in RUE per licensed provider order with ultrasound guidance. 18g, 10 cm line placed in cephalic vein after 1 attempt(s).  Catheter inserted with brisk blood return. Secured with 0cm external from insertion site.  Line flushed without resistance with 10 mL 0.9% normal saline.  Midline secured with Biopatch and Tegaderm.     Midline placement is confirmed by nurse using ultrasound and ability to flush and draw blood. Midline is appropriate for use at this time.  No X-ray is needed for placement confirmation. Pt tolerated procedure well.  Patient condition relayed to unit RN or ordering physician via this post procedure note in the EMR.    Ultrasound images uploaded to PACS and viewable in the EMR - yes  Ultrasound imaged printed and placed in paper chart - no      BARD PowerGlide Midline ref # [id number], Lot # [id number], Expiration Date May 31, 2022

## 2021-10-03 ENCOUNTER — APPOINTMENT (OUTPATIENT)
Dept: RADIOLOGY | Facility: MEDICAL CENTER | Age: 31
DRG: 199 | End: 2021-10-03
Attending: INTERNAL MEDICINE
Payer: COMMERCIAL

## 2021-10-03 LAB
ANION GAP SERPL CALC-SCNC: 7 MMOL/L (ref 7–16)
BASOPHILS # BLD AUTO: 0.5 % (ref 0–1.8)
BASOPHILS # BLD: 0.04 K/UL (ref 0–0.12)
BUN SERPL-MCNC: 8 MG/DL (ref 8–22)
CALCIUM SERPL-MCNC: 8.4 MG/DL (ref 8.5–10.5)
CHLORIDE SERPL-SCNC: 102 MMOL/L (ref 96–112)
CO2 SERPL-SCNC: 30 MMOL/L (ref 20–33)
CREAT SERPL-MCNC: 0.55 MG/DL (ref 0.5–1.4)
EOSINOPHIL # BLD AUTO: 0.24 K/UL (ref 0–0.51)
EOSINOPHIL NFR BLD: 3.2 % (ref 0–6.9)
ERYTHROCYTE [DISTWIDTH] IN BLOOD BY AUTOMATED COUNT: 46.1 FL (ref 35.9–50)
GLUCOSE BLD-MCNC: 108 MG/DL (ref 65–99)
GLUCOSE BLD-MCNC: 122 MG/DL (ref 65–99)
GLUCOSE BLD-MCNC: 128 MG/DL (ref 65–99)
GLUCOSE BLD-MCNC: 131 MG/DL (ref 65–99)
GLUCOSE BLD-MCNC: 163 MG/DL (ref 65–99)
GLUCOSE BLD-MCNC: 164 MG/DL (ref 65–99)
GLUCOSE SERPL-MCNC: 172 MG/DL (ref 65–99)
HCT VFR BLD AUTO: 23 % (ref 42–52)
HCT VFR BLD AUTO: 25.4 % (ref 42–52)
HGB BLD-MCNC: 7.3 G/DL (ref 14–18)
HGB BLD-MCNC: 8 G/DL (ref 14–18)
IMM GRANULOCYTES # BLD AUTO: 0.04 K/UL (ref 0–0.11)
IMM GRANULOCYTES NFR BLD AUTO: 0.5 % (ref 0–0.9)
LYMPHOCYTES # BLD AUTO: 1.26 K/UL (ref 1–4.8)
LYMPHOCYTES NFR BLD: 16.8 % (ref 22–41)
MAGNESIUM SERPL-MCNC: 2.2 MG/DL (ref 1.5–2.5)
MCH RBC QN AUTO: 28.6 PG (ref 27–33)
MCHC RBC AUTO-ENTMCNC: 31.7 G/DL (ref 33.7–35.3)
MCV RBC AUTO: 90.2 FL (ref 81.4–97.8)
MONOCYTES # BLD AUTO: 0.88 K/UL (ref 0–0.85)
MONOCYTES NFR BLD AUTO: 11.7 % (ref 0–13.4)
NEUTROPHILS # BLD AUTO: 5.04 K/UL (ref 1.82–7.42)
NEUTROPHILS NFR BLD: 67.3 % (ref 44–72)
NRBC # BLD AUTO: 0 K/UL
NRBC BLD-RTO: 0 /100 WBC
PLATELET # BLD AUTO: 348 K/UL (ref 164–446)
PMV BLD AUTO: 8.9 FL (ref 9–12.9)
POTASSIUM SERPL-SCNC: 3.9 MMOL/L (ref 3.6–5.5)
RBC # BLD AUTO: 2.55 M/UL (ref 4.7–6.1)
SODIUM SERPL-SCNC: 139 MMOL/L (ref 135–145)
WBC # BLD AUTO: 7.5 K/UL (ref 4.8–10.8)

## 2021-10-03 PROCEDURE — 700105 HCHG RX REV CODE 258: Performed by: INTERNAL MEDICINE

## 2021-10-03 PROCEDURE — 82962 GLUCOSE BLOOD TEST: CPT

## 2021-10-03 PROCEDURE — 80048 BASIC METABOLIC PNL TOTAL CA: CPT

## 2021-10-03 PROCEDURE — 83735 ASSAY OF MAGNESIUM: CPT

## 2021-10-03 PROCEDURE — 85025 COMPLETE CBC W/AUTO DIFF WBC: CPT

## 2021-10-03 PROCEDURE — 85018 HEMOGLOBIN: CPT

## 2021-10-03 PROCEDURE — 71045 X-RAY EXAM CHEST 1 VIEW: CPT

## 2021-10-03 PROCEDURE — 99233 SBSQ HOSP IP/OBS HIGH 50: CPT | Performed by: NURSE PRACTITIONER

## 2021-10-03 PROCEDURE — 770006 HCHG ROOM/CARE - MED/SURG/GYN SEMI*

## 2021-10-03 PROCEDURE — 700111 HCHG RX REV CODE 636 W/ 250 OVERRIDE (IP): Performed by: INTERNAL MEDICINE

## 2021-10-03 PROCEDURE — 85014 HEMATOCRIT: CPT

## 2021-10-03 RX ADMIN — INSULIN HUMAN 2 UNITS: 100 INJECTION, SOLUTION PARENTERAL at 05:22

## 2021-10-03 RX ADMIN — INSULIN HUMAN 2 UNITS: 100 INJECTION, SOLUTION PARENTERAL at 23:30

## 2021-10-03 RX ADMIN — CEFEPIME 2 G: 2 INJECTION, POWDER, FOR SOLUTION INTRAVENOUS at 05:19

## 2021-10-03 RX ADMIN — ENOXAPARIN SODIUM 40 MG: 40 INJECTION SUBCUTANEOUS at 05:20

## 2021-10-03 RX ADMIN — INSULIN HUMAN 2 UNITS: 100 INJECTION, SOLUTION PARENTERAL at 18:51

## 2021-10-03 ASSESSMENT — ENCOUNTER SYMPTOMS
CARDIOVASCULAR NEGATIVE: 1
PSYCHIATRIC NEGATIVE: 1
FEVER: 0
GASTROINTESTINAL NEGATIVE: 1
CHILLS: 0
NEUROLOGICAL NEGATIVE: 1
COUGH: 1
SHORTNESS OF BREATH: 1
MUSCULOSKELETAL NEGATIVE: 1
EYES NEGATIVE: 1

## 2021-10-03 ASSESSMENT — PAIN DESCRIPTION - PAIN TYPE
TYPE: ACUTE PAIN
TYPE: ACUTE PAIN

## 2021-10-03 NOTE — PROGRESS NOTES
Pt reports feeling that it is easier to breath while in chair with arms on tray table. Discussed lung expansion, fluid drainage, and IS use while sitting vs laying flat. Pt reports he lays flat at night and notes a difference. Educated on turns and changing positions frequently to facilitate ease of breathing and increase comfort while breathing and using IS.

## 2021-10-03 NOTE — PROGRESS NOTES
Bedside shift report received from day shift RN. Patient is A+Ox4 and is resting comfortably. Patient states 4/10 pain and denies need for pain medication at this time as he boluses himself appropriately for pain management. Patient updated on current plan of care and shift goals established. No further needs at this time. Bed locked in lowest position, upper bed rails up, call light and belongings within reach. Hourly rounding in place.

## 2021-10-03 NOTE — PROGRESS NOTES
[facility] Medicine Daily Progress Note    Date of Service  10/3/2021    Chief Complaint  Heriberto Boggs is a 31 y.o. male admitted 9/28/2021 with respiratory distress    Hospital Course  Mr. Boggs is a 31-year-old male with a PMHx of DM T2, asthma, who developed COVID-19 viral pneumonia approximately 5 weeks ago and has had multiple admissions since then.  Patient was transferred over from [facility] to [facility] for advance care.  Patient was initially in the emergency department and had multiple hospitalization over the past month for complications secondary to COVID-19 pneumonia.    Admission on 9/22-9/25 diagnosed him with lower extremity DVT and was discharged on Xarelto and oxygen supplementation.    On 9/26, patient developed sudden onset of bilateral severe chest pain with worsening dyspnea and severe hypoxia.  Patient presented back to the emergency department where he was noted to have bilateral pneumothoraces and underwent chest tube placement and was started on remdesivir, vancomycin, cefdinir, and Decadron.  Patient also started to have hemoptysis and CT of the chest revealed bilateral large pneumatoceles with blood attenuation on each side as well as residual pneumothoraces left greater than right which she was subsequently transferred to our facility for thoracic surgery evaluation.    Patient initially admitted to the intensive care unit where broad-spectrum antibiotics was continued.  Anticoagulation was also discontinued secondary to his hemoptysis and CT findings.  Thoracic surgeon Dr. Montes was consulted which ordered a repeat CT on 9/29.  Patient still did complain of chest wall pain, and pain over chest tube sites and has been having productive cough with hemoptysis.  No fever, or chills.  Patient placed on 4L NC O2.  Patient also having intermittent nausea and poor appetite.  No melena, or rectal bleeding noted.  Patient is complaining of malaise and fatigue.  Bilateral chest tube  is on water seal as of 9/30.  Patient transferred into the surgical floor for further evaluation and monitoring.      Interval Problem Update  10/1/2021  -Patient seen and examined.  Patient reports feeling fatigue and malaise. Denies SOB, not in distress. B/L chest tube noted and on waterseal.  Patient updated in plan of care.  -Plan of care: Monitor chest tube output; left side is on waterseal; discussed case with pulmonologist Dr. Linn which she had discussed case with interventional radiology -at this time no drainage -pending further recommendations  -Disposition: Patient will need bilateral chest tube removed prior to discharge; recommendations from surgical and pulmonology team  -Lab work: Reviewed; unremarkable  -Imaging: CT -reviewed  -VSS at this time    10/2/2021  -Patient seen and examined.  Patient noted to be on sinus tachycardia overnight with HR reaching up to the 150s, but also had an episode of bradycardia with HR down to 26.  Repeat EKG noted sinus tach.  Patient also reports feeling very tired and have severe shortness of breath when patient pulls off the mask.  Patient saturation down to 60% when his oxygen mask is off.  Patient sitting up on chair with some tolerance.  Discussed with patient plan of care in conjunction with pulmonology.  Patient updated in plan of care.  -Plan of care: Left chest tube discontinued on 10/1 by pulmonology team -monitor site; right chest tube on waterseal -monitor output; continue to encourage patient to work with respiratory therapy; pulmonology Dr. Linn following; thoracic surgeon Dr. Montes following.  Get updated EKG due to cardiac rhythm changes overnight; place patient on telemetry monitoring; midline orders placed due to lack of access.  -Disposition: Pending chest tube removal and further recommendations from pulmonology and thoracic surgery  -Lab work: Reviewed; unremarkable  -Imaging: Chest x-ray -right chest tube seen, unchanged airspace opacity in  right lung, unchanged bulging elliptical opacity in left hemithorax, unchanged tiny left apical pneumothorax, stable cardiopericardial silhouette  -VSS at this time    10/3/2021  -Patient seen and examined.  Patient oxygen needs slightly increased from 6L to 7L oxygen mask.  CXR reviewed this morning with no significant changes and is stable.  Pulmonology team following.  Patient denies any severe shortness of breath or in distress.  Encourage patient to keep utilizing incentive spirometry.  Updated in plan of care.  -Plan of care: Monitor chest tube sites; cover with petroleum gauze dressing; continue pulmonary toilet as recommended by pulmonology team; encourage ambulation; no recommendations for IR drain placement by pulmonology and Dr. Montes; continue telemetry monitoring -no acute changes on HR overnight.  -Disposition: Pending progression from pulmonary toilet; will need clearance from pulmonology and thoracic surgeon  -Lab work: Reviewed; expected -keep close monitor Hgb level at 7.3 today  -VSS at this time  -There are no significant changes from a previous ROS/PE, please see my previous notes for further details.     I have personally seen and examined the patient at bedside. I discussed the plan of care with patient and bedside RN.    Consultants/Specialty  pulmonary and thoracic surgery    Code Status  Full Code    Disposition  Patient is not medically cleared.   Anticipate discharge to to home with close outpatient follow-up.  I have placed the appropriate orders for post-discharge needs.    Review of Systems  Review of Systems   Constitutional: Positive for malaise/fatigue. Negative for chills and fever.   HENT: Negative.    Eyes: Negative.    Respiratory: Positive for cough and shortness of breath.    Cardiovascular: Negative.    Gastrointestinal: Negative.    Genitourinary: Negative.    Musculoskeletal: Negative.    Skin: Negative.    Neurological: Negative.    Endo/Heme/Allergies: Negative.     Psychiatric/Behavioral: Negative.         Physical Exam  Temp:  [36.7 °C (98.1 °F)-37.2 °C (99 °F)] 37.2 °C (99 °F)  Pulse:  [] 99  Resp:  [16-20] 16  BP: (114-135)/(67-90) 117/79  SpO2:  [90 %-99 %] 99 %    Physical Exam  Vitals and nursing note reviewed.   HENT:      Head: Normocephalic.      Nose: Nose normal.      Mouth/Throat:      Mouth: Mucous membranes are moist.   Eyes:      Pupils: Pupils are equal, round, and reactive to light.   Cardiovascular:      Rate and Rhythm: Regular rhythm. Tachycardia present.      Pulses: Normal pulses.      Heart sounds: Normal heart sounds.   Pulmonary:      Breath sounds: Rales present.   Chest:      Chest wall: Tenderness present.   Abdominal:      General: Bowel sounds are normal.      Palpations: Abdomen is soft.      Tenderness: There is generalized abdominal tenderness.       Musculoskeletal:         General: Tenderness present.      Cervical back: Normal range of motion and neck supple.   Skin:     General: Skin is dry.      Capillary Refill: Capillary refill takes 2 to 3 seconds.   Neurological:      Mental Status: He is alert. Mental status is at baseline.         Fluids    Intake/Output Summary (Last 24 hours) at 10/3/2021 1049  Last data filed at 10/3/2021 0644  Gross per 24 hour   Intake 604 ml   Output 1006 ml   Net -402 ml       Laboratory  Recent Labs     10/01/21  0417 10/02/21  0408 10/03/21  0530   WBC 8.8 9.1 7.5   RBC 2.93* 2.98* 2.55*   HEMOGLOBIN 8.4* 8.7* 7.3*   HEMATOCRIT 26.0* 27.3* 23.0*   MCV 88.7 91.6 90.2   MCH 28.7 29.2 28.6   MCHC 32.3* 31.9* 31.7*   RDW 45.1 46.9 46.1   PLATELETCT 470* 460* 348   MPV 9.1 9.2 8.9*     Recent Labs     10/01/21  0417 10/02/21  0408 10/03/21  0530   SODIUM 136 135 139   POTASSIUM 4.2 4.2 3.9   CHLORIDE 100 99 102   CO2 29 29 30   GLUCOSE 152* 147* 172*   BUN 11 11 8   CREATININE 0.57 0.63 0.55   CALCIUM 8.2* 8.6 8.4*                   Imaging  DX-CHEST-LIMITED (1 VIEW)   Final Result      1.  Stable  predominantly right-sided airspace disease consistent with Covid 19 pneumonia.   2.  Stable left apical pneumothorax.      IR-MIDLINE CATHETER INSERTION WO GUIDANCE > AGE 3   Final Result                  Ultrasound-guided midline placement performed by qualified nursing staff    as above.          DX-CHEST-LIMITED (1 VIEW)   Final Result         No significant interval change.            DX-CHEST-PORTABLE (1 VIEW)   Final Result      1.  Unchanged bilateral pulmonary opacities.   2.  No pneumothorax is seen.      DX-CHEST-LIMITED (1 VIEW)   Final Result      1.  Unchanged BILATERAL pulmonary opacities which may reflect a combination of airspace disease and blood or other fluid within pneumatoceles. Necrotizing pneumonia is also a consideration.   2.  Small LEFT pneumothorax, unchanged      DX-CHEST-LIMITED (1 VIEW)   Final Result         Slightly improved airspace opacity in the right lung.         DX-CHEST-LIMITED (1 VIEW)   Final Result         No significant interval change. Left chest tube is in the left basilar hemithorax.      CT-CHEST (THORAX) WITH   Final Result      1.  Trace right and small left pneumothorax.   2.  There is a large left loculated high density fluid collection, may be consolidated blood in a pneumatocele.   3.  There is a left chest tube with the tip in the pericardial fat. There is associated subcutaneous emphysema.   4.  There is a loculated high density right collection located posteriorly, similar in appearance to the collection on the left. There is a right pleural space drainage catheter with tip in the superior anterior pleura.   5.  Extensive mixed groundglass and consolidative bilateral pulmonary opacities.      Findings communicated with Dr. Fields by Dr. Verde at 4:41 PM 9/29/2021               DX-CHEST-LIMITED (1 VIEW)   Final Result      No significant interval change.      OUTSIDE IMAGES-US VASCULAR   Final Result      OUTSIDE IMAGES-DX CHEST   Final Result      CT-FOREIGN  FILM CAT SCAN   Final Result      OUTSIDE IMAGES-DX CHEST   Final Result      OUTSIDE IMAGES-DX CHEST   Final Result      OUTSIDE IMAGES-DX CHEST   Final Result      OUTSIDE IMAGES-DX CHEST   Final Result      OUTSIDE IMAGES-DX CHEST   Final Result      OUTSIDE IMAGES-DX CHEST   Final Result      CT-FOREIGN FILM CAT SCAN   Final Result      DX-CHEST-LIMITED (1 VIEW)   Final Result         No significant interval change. Small left apical pneumothorax.      DX-CHEST-PORTABLE (1 VIEW)   Final Result         Left apical pneumothorax is seen. There is a bulging elliptical opacity in the left hemithorax which could relate to loculated fluid or hematoma.      CRITICAL RESULT READ BACK: Preliminary findings discussed with and critical read back performed by Dr. RADHA TAPIA JR via telephone on 9/28/2021 10:58 PM           Assessment/Plan  * Pneumonia due to COVID-19 virus- (present on admission)  Assessment & Plan  -MRSA PCR negative  -Vancomycin discontinued  -Continue cefepime  -Continue close clinical monitoring  -Previously treated with Decadron remdesivir and barcitinib    DVT (deep venous thrombosis) (HCC)- (present on admission)  Assessment & Plan  -Patient Xarelto has been on hold given hemoptysis and concern for bleeding in his pneumatoceles.  -If hemoglobin is stable consider resuming anticoagulation with heparin drip when bleeding risk felt to be acceptable by pulmonary discussed with Dr Tapia who will discuss with Dr Linn  -Monitor Hgb level -at 7.3 today    Anemia associated with acute blood loss- (present on admission)  Assessment & Plan  -Monitor hemoglobin and transfuse if less than 7      Thrombocytosis- (present on admission)  Assessment & Plan  Likely reactive monitor CBC    Type 2 diabetes mellitus without complication, without long-term current use of insulin (HCC)- (present on admission)  Assessment & Plan  Insulin sliding scale monitor CBGs.  Recent Labs     09/28/21  2306 09/29/21  7226  09/29/21  1154 09/29/21  1738 09/29/21  2342 09/30/21  0552 09/30/21  1129   POCGLUCOSE 168* 153* 142* 166* 121* 134* 179*        Check hemoglobin A1c    Pneumatocele of lung- (present on admission)  Assessment & Plan  -Pulmonary and thoracic surgery following  -Dr. Montes does not feel that any surgical intervention is indicated at this time  -Continue current antibiotics and monitor chest tube output  -Pulmonology team following  -Left chest tube discontinued on 10/1  -Right chest tube placed on waterseal on 10/1    Bilateral pneumothoraces- (present on admission)  Assessment & Plan  -Chest tube management per surgery  -Pulmonology, Dr. Linn consulted  -LEFT removed on 10/1  -RIGHT removed on 10/2  -Continue pulmonary toilet  -No recommendations for IR drain at this time per pulmonology and thoracic surgery    Acute respiratory failure with hypoxia (HCC)- (present on admission)  Assessment & Plan  -Improved oxygen requirements  -Chest tube management per surgery  -RT protocol       VTE prophylaxis: enoxaparin ppx    I have performed a physical exam and reviewed and updated ROS and Plan today (10/3/2021). In review of yesterday's note (10/2/2021), there are no changes except as documented above.    ==========================================================================================================  Please note that this dictation was created using voice recognition software. I have made every reasonable attempt to correct obvious errors, but there may be errors of grammar and possibly content that I did not discover before finalizing the note.    Electronically signed by:  JOSE MIGUEL Narvaez, MSN, APRN, FNP-C  Hospitalist Services  [facility]  [phone]  [email or web address]  10/03/21     8008

## 2021-10-03 NOTE — CARE PLAN
Progress made toward(s) clinical / shift goals:  fall risk interventions in place, medicated per MAR    Patient is not progressing towards the following goals:      Problem: Knowledge Deficit - Standard  Goal: Patient and family/care givers will demonstrate understanding of plan of care, disease process/condition, diagnostic tests and medications  Outcome: Progressing     Problem: Pain - Standard  Goal: Alleviation of pain or a reduction in pain to the patient’s comfort goal  Outcome: Progressing     Problem: Skin Integrity  Goal: Skin integrity is maintained or improved  Outcome: Progressing     Problem: Fall Risk  Goal: Patient will remain free from falls  Outcome: Progressing

## 2021-10-03 NOTE — CARE PLAN
The patient is Watcher - Medium risk of patient condition declining or worsening    Shift Goals  Clinical Goals: Pain control, mobilize secretions, rest, decreased work of breathing  Patient Goals: Pain control, mobilize secretions, rest, decreased work of breathing  Family Goals: NA    Problem: Knowledge Deficit - Standard  Goal: Patient and family/care givers will demonstrate understanding of plan of care, disease process/condition, diagnostic tests and medications  Outcome: Progressing     Problem: Pain - Standard  Goal: Alleviation of pain or a reduction in pain to the patient’s comfort goal  Outcome: Progressing     Problem: Skin Integrity  Goal: Skin integrity is maintained or improved  Outcome: Progressing     Problem: Fall Risk  Goal: Patient will remain free from falls  Outcome: Progressing       Progress made toward(s) clinical / shift goals:      Patient updated on current plan of care and verbalizes understanding. Pain has been managed with a Morphine PCA. Patient able to turn and reposition himself in bed. Fall education provided and patient verbalizes understanding. Patient calls appropriately for assistance.     Patient is not progressing towards the following goals:

## 2021-10-04 ENCOUNTER — APPOINTMENT (OUTPATIENT)
Dept: RADIOLOGY | Facility: MEDICAL CENTER | Age: 31
DRG: 199 | End: 2021-10-04
Attending: INTERNAL MEDICINE
Payer: COMMERCIAL

## 2021-10-04 PROBLEM — J98.4 PNEUMATOCELE OF LUNG: Status: RESOLVED | Noted: 2021-09-28 | Resolved: 2021-10-04

## 2021-10-04 PROBLEM — R00.0 SINUS TACHYCARDIA: Status: ACTIVE | Noted: 2021-10-04

## 2021-10-04 PROBLEM — D75.839 THROMBOCYTOSIS: Status: RESOLVED | Noted: 2021-09-28 | Resolved: 2021-10-04

## 2021-10-04 LAB
ANION GAP SERPL CALC-SCNC: 6 MMOL/L (ref 7–16)
BASOPHILS # BLD AUTO: 0.5 % (ref 0–1.8)
BASOPHILS # BLD: 0.04 K/UL (ref 0–0.12)
BUN SERPL-MCNC: 11 MG/DL (ref 8–22)
CALCIUM SERPL-MCNC: 8.6 MG/DL (ref 8.5–10.5)
CHLORIDE SERPL-SCNC: 103 MMOL/L (ref 96–112)
CO2 SERPL-SCNC: 30 MMOL/L (ref 20–33)
CREAT SERPL-MCNC: 0.67 MG/DL (ref 0.5–1.4)
EOSINOPHIL # BLD AUTO: 0.24 K/UL (ref 0–0.51)
EOSINOPHIL NFR BLD: 3 % (ref 0–6.9)
ERYTHROCYTE [DISTWIDTH] IN BLOOD BY AUTOMATED COUNT: 46.4 FL (ref 35.9–50)
GLUCOSE BLD-MCNC: 132 MG/DL (ref 65–99)
GLUCOSE BLD-MCNC: 182 MG/DL (ref 65–99)
GLUCOSE BLD-MCNC: 183 MG/DL (ref 65–99)
GLUCOSE SERPL-MCNC: 154 MG/DL (ref 65–99)
HCT VFR BLD AUTO: 24.6 % (ref 42–52)
HGB BLD-MCNC: 7.8 G/DL (ref 14–18)
IMM GRANULOCYTES # BLD AUTO: 0.05 K/UL (ref 0–0.11)
IMM GRANULOCYTES NFR BLD AUTO: 0.6 % (ref 0–0.9)
LYMPHOCYTES # BLD AUTO: 1.58 K/UL (ref 1–4.8)
LYMPHOCYTES NFR BLD: 20 % (ref 22–41)
MAGNESIUM SERPL-MCNC: 2.1 MG/DL (ref 1.5–2.5)
MCH RBC QN AUTO: 28.6 PG (ref 27–33)
MCHC RBC AUTO-ENTMCNC: 31.7 G/DL (ref 33.7–35.3)
MCV RBC AUTO: 90.1 FL (ref 81.4–97.8)
MONOCYTES # BLD AUTO: 0.85 K/UL (ref 0–0.85)
MONOCYTES NFR BLD AUTO: 10.7 % (ref 0–13.4)
NEUTROPHILS # BLD AUTO: 5.15 K/UL (ref 1.82–7.42)
NEUTROPHILS NFR BLD: 65.2 % (ref 44–72)
NRBC # BLD AUTO: 0 K/UL
NRBC BLD-RTO: 0 /100 WBC
PLATELET # BLD AUTO: 412 K/UL (ref 164–446)
PMV BLD AUTO: 8.9 FL (ref 9–12.9)
POTASSIUM SERPL-SCNC: 3.9 MMOL/L (ref 3.6–5.5)
PROCALCITONIN SERPL-MCNC: 0.13 NG/ML
RBC # BLD AUTO: 2.73 M/UL (ref 4.7–6.1)
SODIUM SERPL-SCNC: 139 MMOL/L (ref 135–145)
WBC # BLD AUTO: 7.9 K/UL (ref 4.8–10.8)

## 2021-10-04 PROCEDURE — 700105 HCHG RX REV CODE 258: Performed by: NURSE PRACTITIONER

## 2021-10-04 PROCEDURE — 83735 ASSAY OF MAGNESIUM: CPT

## 2021-10-04 PROCEDURE — 700101 HCHG RX REV CODE 250: Performed by: NURSE PRACTITIONER

## 2021-10-04 PROCEDURE — 700111 HCHG RX REV CODE 636 W/ 250 OVERRIDE (IP): Performed by: NURSE PRACTITIONER

## 2021-10-04 PROCEDURE — 99233 SBSQ HOSP IP/OBS HIGH 50: CPT | Performed by: NURSE PRACTITIONER

## 2021-10-04 PROCEDURE — 82962 GLUCOSE BLOOD TEST: CPT

## 2021-10-04 PROCEDURE — 770020 HCHG ROOM/CARE - TELE (206)

## 2021-10-04 PROCEDURE — 84145 PROCALCITONIN (PCT): CPT

## 2021-10-04 PROCEDURE — 700102 HCHG RX REV CODE 250 W/ 637 OVERRIDE(OP): Performed by: INTERNAL MEDICINE

## 2021-10-04 PROCEDURE — 36415 COLL VENOUS BLD VENIPUNCTURE: CPT

## 2021-10-04 PROCEDURE — 80048 BASIC METABOLIC PNL TOTAL CA: CPT

## 2021-10-04 PROCEDURE — 87040 BLOOD CULTURE FOR BACTERIA: CPT

## 2021-10-04 PROCEDURE — 71045 X-RAY EXAM CHEST 1 VIEW: CPT

## 2021-10-04 PROCEDURE — 700111 HCHG RX REV CODE 636 W/ 250 OVERRIDE (IP): Performed by: INTERNAL MEDICINE

## 2021-10-04 PROCEDURE — 85025 COMPLETE CBC W/AUTO DIFF WBC: CPT

## 2021-10-04 PROCEDURE — A9270 NON-COVERED ITEM OR SERVICE: HCPCS | Performed by: INTERNAL MEDICINE

## 2021-10-04 RX ORDER — OXYCODONE HYDROCHLORIDE 10 MG/1
10 TABLET ORAL
Status: DISCONTINUED | OUTPATIENT
Start: 2021-10-04 | End: 2021-10-08 | Stop reason: HOSPADM

## 2021-10-04 RX ORDER — OXYCODONE HYDROCHLORIDE 5 MG/1
5 TABLET ORAL
Status: DISCONTINUED | OUTPATIENT
Start: 2021-10-04 | End: 2021-10-08 | Stop reason: HOSPADM

## 2021-10-04 RX ORDER — FUROSEMIDE 10 MG/ML
40 INJECTION INTRAMUSCULAR; INTRAVENOUS ONCE
Status: COMPLETED | OUTPATIENT
Start: 2021-10-04 | End: 2021-10-04

## 2021-10-04 RX ORDER — HYDROMORPHONE HYDROCHLORIDE 1 MG/ML
0.5 INJECTION, SOLUTION INTRAMUSCULAR; INTRAVENOUS; SUBCUTANEOUS
Status: DISCONTINUED | OUTPATIENT
Start: 2021-10-04 | End: 2021-10-08 | Stop reason: HOSPADM

## 2021-10-04 RX ADMIN — ENOXAPARIN SODIUM 40 MG: 40 INJECTION SUBCUTANEOUS at 06:10

## 2021-10-04 RX ADMIN — CEFEPIME 2 G: 2 INJECTION, POWDER, FOR SOLUTION INTRAVENOUS at 18:27

## 2021-10-04 RX ADMIN — INSULIN HUMAN 2 UNITS: 100 INJECTION, SOLUTION PARENTERAL at 18:32

## 2021-10-04 RX ADMIN — ACETAMINOPHEN 650 MG: 325 TABLET, FILM COATED ORAL at 18:33

## 2021-10-04 RX ADMIN — ACETAMINOPHEN 650 MG: 325 TABLET, FILM COATED ORAL at 11:43

## 2021-10-04 RX ADMIN — FUROSEMIDE 40 MG: 10 INJECTION, SOLUTION INTRAMUSCULAR; INTRAVENOUS at 11:43

## 2021-10-04 RX ADMIN — DOCUSATE SODIUM 50 MG AND SENNOSIDES 8.6 MG 2 TABLET: 8.6; 5 TABLET, FILM COATED ORAL at 06:12

## 2021-10-04 RX ADMIN — MUPIROCIN 0.1 TUBE: 20 OINTMENT TOPICAL at 06:11

## 2021-10-04 RX ADMIN — CEFEPIME 2 G: 2 INJECTION, POWDER, FOR SOLUTION INTRAVENOUS at 11:44

## 2021-10-04 RX ADMIN — INSULIN HUMAN 2 UNITS: 100 INJECTION, SOLUTION PARENTERAL at 11:45

## 2021-10-04 RX ADMIN — DOCUSATE SODIUM 50 MG AND SENNOSIDES 8.6 MG 2 TABLET: 8.6; 5 TABLET, FILM COATED ORAL at 18:27

## 2021-10-04 ASSESSMENT — ENCOUNTER SYMPTOMS
VOMITING: 0
ABDOMINAL PAIN: 0
WHEEZING: 0
SPUTUM PRODUCTION: 0
COUGH: 1
SHORTNESS OF BREATH: 1
CHILLS: 0
NAUSEA: 0
FEVER: 0
HEMOPTYSIS: 0

## 2021-10-04 ASSESSMENT — PAIN DESCRIPTION - PAIN TYPE
TYPE: ACUTE PAIN
TYPE: ACUTE PAIN
TYPE: SURGICAL PAIN
TYPE: ACUTE PAIN
TYPE: ACUTE PAIN

## 2021-10-04 NOTE — CARE PLAN
The patient is Stable - Low risk of patient condition declining or worsening    Shift Goals  Clinical Goals: OOB Activity  Patient Goals: Pain Mgt/ OOB  Family Goals: NA    Progress made toward(s) clinical / shift goals:  Fall and pain education provided at bedside, pt verbalized understanding. Encouraged to use IS and provide self care, OOB activity.       Problem: Knowledge Deficit - Standard  Goal: Patient and family/care givers will demonstrate understanding of plan of care, disease process/condition, diagnostic tests and medications  Outcome: Progressing     Problem: Pain - Standard  Goal: Alleviation of pain or a reduction in pain to the patient’s comfort goal  Outcome: Progressing     Problem: Skin Integrity  Goal: Skin integrity is maintained or improved  Outcome: Progressing     Problem: Fall Risk  Goal: Patient will remain free from falls  Outcome: Progressing

## 2021-10-04 NOTE — PROGRESS NOTES
[facility] Medicine Daily Progress Note    Date of Service  10/4/2021    Chief Complaint  Heriberto Boggs is a 31 y.o. male admitted 9/28/2021 with respiratory distress    Hospital Course  Mr. Boggs is a 31-year-old male with a PMHx of DM T2, asthma, who developed COVID-19 viral pneumonia approximately 5 weeks ago and has had multiple admissions since then.  Patient was transferred over from [facility] to [facility] for advance care.  Patient was initially in the emergency department and had multiple hospitalization over the past month for complications secondary to COVID-19 pneumonia.    Admission on 9/22-9/25 diagnosed him with lower extremity DVT and was discharged on Xarelto and oxygen supplementation.    On 9/26, patient developed sudden onset of bilateral severe chest pain with worsening dyspnea and severe hypoxia.  Patient presented back to the emergency department where he was noted to have bilateral pneumothoraces and underwent chest tube placement and was started on remdesivir, vancomycin, cefdinir, and Decadron.  Patient also started to have hemoptysis and CT of the chest revealed bilateral large pneumatoceles with blood attenuation on each side as well as residual pneumothoraces left greater than right which she was subsequently transferred to our facility for thoracic surgery evaluation.    Patient initially admitted to the intensive care unit where broad-spectrum antibiotics was continued.  Anticoagulation was also discontinued secondary to his hemoptysis and CT findings.  Thoracic surgeon Dr. Montes was consulted which ordered a repeat CT on 9/29.  Patient still did complain of chest wall pain, and pain over chest tube sites and has been having productive cough with hemoptysis.  No fever, or chills.  Patient placed on 4L NC O2.  Patient also having intermittent nausea and poor appetite.  No melena, or rectal bleeding noted.  Patient is complaining of malaise and fatigue.  Bilateral chest tube  is on water seal as of 9/30.  Patient transferred into the surgical floor for further evaluation and monitoring.      Interval Problem Update  10/1/2021  -Patient seen and examined.  Patient reports feeling fatigue and malaise. Denies SOB, not in distress. B/L chest tube noted and on waterseal.  Patient updated in plan of care.  -Plan of care: Monitor chest tube output; left side is on waterseal; discussed case with pulmonologist Dr. Linn which she had discussed case with interventional radiology -at this time no drainage -pending further recommendations  -Disposition: Patient will need bilateral chest tube removed prior to discharge; recommendations from surgical and pulmonology team    10/2/2021  -Patient seen and examined.  Patient noted to be on sinus tachycardia overnight with HR reaching up to the 150s, but also had an episode of bradycardia with HR down to 26.  Repeat EKG noted sinus tach.  Patient also reports feeling very tired and have severe shortness of breath when patient pulls off the mask.  Patient saturation down to 60% when his oxygen mask is off.  Patient sitting up on chair with some tolerance.  Discussed with patient plan of care in conjunction with pulmonology.  Patient updated in plan of care.  -Plan of care: Left chest tube discontinued on 10/1 by pulmonology team -monitor site; right chest tube on waterseal -monitor output; continue to encourage patient to work with respiratory therapy; pulmonology Dr. Linn following; thoracic surgeon Dr. Montes following.  Get updated EKG due to cardiac rhythm changes overnight; place patient on telemetry monitoring; midline orders placed due to lack of access.  -Disposition: Pending chest tube removal and further recommendations from pulmonology and thoracic surgery    10/3/2021  -Patient seen and examined.  Patient oxygen needs slightly increased from 6L to 7L oxygen mask.  CXR reviewed this morning with no significant changes and is stable.   Pulmonology team following.  Patient denies any severe shortness of breath or in distress.  Encourage patient to keep utilizing incentive spirometry.  Updated in plan of care.  -Plan of care: Monitor chest tube sites; cover with petroleum gauze dressing; continue pulmonary toilet as recommended by pulmonology team; encourage ambulation; no recommendations for IR drain placement by pulmonology and Dr. Montes; continue telemetry monitoring -no acute changes on HR overnight.  -Disposition: Pending progression from pulmonary toilet; will need clearance from pulmonology and thoracic surgeon    10/4/2021  - Worsening O2 demands, requiring 7-10L o2 via oxy mask. CXR, personally reviewed, left-sided opacification stable but slightly worsening pulmonary edema on right. He does appear hypervolemic on exam. Will trial 40mg lasix IV.   -Blood cx and procal ordered per pulmonary recs  -H/H stable. WBC normal.   -PT/OT consultations placed.   -Pain controlled with Morphine PCA. Patient lethargic but opens eyes to voice and answering questions appropriately. -HR . BP stable.     I have personally seen and examined the patient at bedside. I discussed the plan of care with patient and bedside RN.    Consultants/Specialty  pulmonary and thoracic surgery    Code Status  Full Code    Disposition  Patient is not medically cleared.   Anticipate discharge to D.  I have placed the appropriate orders for post-discharge needs.    Review of Systems  Review of Systems   Constitutional: Positive for malaise/fatigue. Negative for chills and fever.   Respiratory: Positive for cough and shortness of breath. Negative for hemoptysis, sputum production and wheezing.    Gastrointestinal: Negative for abdominal pain, nausea and vomiting.   All other systems reviewed and are negative.       Physical Exam  Temp:  [36.8 °C (98.3 °F)-37.8 °C (100 °F)] 37.8 °C (100 °F)  Pulse:  [] 111  Resp:  [16-18] 18  BP: (119-130)/(72-83) 130/72  SpO2:   [94 %-98 %] 94 %    Physical Exam  Vitals and nursing note reviewed.   Constitutional:       Appearance: He is obese. He is ill-appearing.   HENT:      Head: Normocephalic.      Nose: Nose normal.      Mouth/Throat:      Mouth: Mucous membranes are moist.   Eyes:      General: No scleral icterus.        Right eye: No discharge.         Left eye: No discharge.      Pupils: Pupils are equal, round, and reactive to light.   Cardiovascular:      Rate and Rhythm: Regular rhythm. Tachycardia present.      Pulses: Normal pulses.      Heart sounds: Normal heart sounds.   Pulmonary:      Effort: No respiratory distress.      Breath sounds: No stridor. No wheezing.      Comments: Bilateral crackles   Chest:      Chest wall: Tenderness present.   Abdominal:      General: Bowel sounds are normal. There is no distension.      Palpations: Abdomen is soft.      Tenderness: There is generalized abdominal tenderness. There is no guarding.       Musculoskeletal:         General: Tenderness present.      Cervical back: Normal range of motion and neck supple.      Right lower leg: Edema present.      Left lower leg: Edema present.   Skin:     General: Skin is dry.      Capillary Refill: Capillary refill takes 2 to 3 seconds.   Neurological:      Mental Status: Mental status is at baseline. He is lethargic.      GCS: GCS eye subscore is 3. GCS verbal subscore is 5. GCS motor subscore is 6.         Fluids    Intake/Output Summary (Last 24 hours) at 10/4/2021 0919  Last data filed at 10/4/2021 0600  Gross per 24 hour   Intake 244.5 ml   Output 350 ml   Net -105.5 ml       Laboratory  Recent Labs     10/02/21  0408 10/02/21  0408 10/03/21  0530 10/03/21  1616 10/04/21  0230   WBC 9.1  --  7.5  --  7.9   RBC 2.98*  --  2.55*  --  2.73*   HEMOGLOBIN 8.7*   < > 7.3* 8.0* 7.8*   HEMATOCRIT 27.3*   < > 23.0* 25.4* 24.6*   MCV 91.6  --  90.2  --  90.1   MCH 29.2  --  28.6  --  28.6   MCHC 31.9*  --  31.7*  --  31.7*   RDW 46.9  --  46.1  --   46.4   PLATELETCT 460*  --  348  --  412   MPV 9.2  --  8.9*  --  8.9*    < > = values in this interval not displayed.     Recent Labs     10/02/21  0408 10/03/21  0530 10/04/21  0230   SODIUM 135 139 139   POTASSIUM 4.2 3.9 3.9   CHLORIDE 99 102 103   CO2 29 30 30   GLUCOSE 147* 172* 154*   BUN 11 8 11   CREATININE 0.63 0.55 0.67   CALCIUM 8.6 8.4* 8.6                   Imaging  DX-CHEST-LIMITED (1 VIEW)   Final Result         1.  Pulmonary edema and/or infiltrates.   2.  Stable opacification along the left lateral chest wall, could represent pulmonary mass versus loculated effusion.      DX-CHEST-LIMITED (1 VIEW)   Final Result      1.  Stable predominantly right-sided airspace disease consistent with Covid 19 pneumonia.   2.  Stable left apical pneumothorax.      IR-MIDLINE CATHETER INSERTION WO GUIDANCE > AGE 3   Final Result                  Ultrasound-guided midline placement performed by qualified nursing staff    as above.          DX-CHEST-LIMITED (1 VIEW)   Final Result         No significant interval change.            DX-CHEST-PORTABLE (1 VIEW)   Final Result      1.  Unchanged bilateral pulmonary opacities.   2.  No pneumothorax is seen.      DX-CHEST-LIMITED (1 VIEW)   Final Result      1.  Unchanged BILATERAL pulmonary opacities which may reflect a combination of airspace disease and blood or other fluid within pneumatoceles. Necrotizing pneumonia is also a consideration.   2.  Small LEFT pneumothorax, unchanged      DX-CHEST-LIMITED (1 VIEW)   Final Result         Slightly improved airspace opacity in the right lung.         DX-CHEST-LIMITED (1 VIEW)   Final Result         No significant interval change. Left chest tube is in the left basilar hemithorax.      CT-CHEST (THORAX) WITH   Final Result      1.  Trace right and small left pneumothorax.   2.  There is a large left loculated high density fluid collection, may be consolidated blood in a pneumatocele.   3.  There is a left chest tube with the  tip in the pericardial fat. There is associated subcutaneous emphysema.   4.  There is a loculated high density right collection located posteriorly, similar in appearance to the collection on the left. There is a right pleural space drainage catheter with tip in the superior anterior pleura.   5.  Extensive mixed groundglass and consolidative bilateral pulmonary opacities.      Findings communicated with Dr. Fields by Dr. Verde at 4:41 PM 9/29/2021               DX-CHEST-LIMITED (1 VIEW)   Final Result      No significant interval change.      OUTSIDE IMAGES-US VASCULAR   Final Result      OUTSIDE IMAGES-DX CHEST   Final Result      CT-FOREIGN FILM CAT SCAN   Final Result      OUTSIDE IMAGES-DX CHEST   Final Result      OUTSIDE IMAGES-DX CHEST   Final Result      OUTSIDE IMAGES-DX CHEST   Final Result      OUTSIDE IMAGES-DX CHEST   Final Result      OUTSIDE IMAGES-DX CHEST   Final Result      OUTSIDE IMAGES-DX CHEST   Final Result      CT-FOREIGN FILM CAT SCAN   Final Result      DX-CHEST-LIMITED (1 VIEW)   Final Result         No significant interval change. Small left apical pneumothorax.      DX-CHEST-PORTABLE (1 VIEW)   Final Result         Left apical pneumothorax is seen. There is a bulging elliptical opacity in the left hemithorax which could relate to loculated fluid or hematoma.      CRITICAL RESULT READ BACK: Preliminary findings discussed with and critical read back performed by Dr. RADHA TAPIA JR via telephone on 9/28/2021 10:58 PM           Assessment/Plan  * Acute respiratory failure with hypoxia (HCC)- (present on admission)  Assessment & Plan  -Requiring 7L-10L O2 at rest via oxymask   -CXR with stable left opacification   -Trial 40mg lasix as he does appear hypervolemic on exam   -Continue on empiric cefepime.   -Check procal   -RT protocol  -Aggressive pulm toileting   -PT/OT to consult     Sinus tachycardia- (present on admission)  Assessment & Plan  -Likely secondary to above   -Pain control    -hemodynamically stable   -Monitor     DVT (deep venous thrombosis) (HCC)- (present on admission)  Assessment & Plan  -Patient Xarelto has been on hold given hemoptysis and concern for bleeding in his pneumatoceles.  -Monitor H/H       Anemia associated with acute blood loss- (present on admission)  Assessment & Plan  -H/H stable  -Monitor hemoglobin and transfuse if less than 7      Type 2 diabetes mellitus without complication, without long-term current use of insulin (HCC)- (present on admission)  Assessment & Plan  Insulin sliding scale monitor CBGs.  Recent Labs     10/02/21  1356 10/02/21  1710 10/03/21  0054 10/03/21  0519 10/03/21  1441 10/03/21  2326 10/04/21  0609   POCGLUCOSE 138* 108* 122* 163* 131* 164* 132*            Bilateral pneumothoraces- (present on admission)  Assessment & Plan  -Pulmonology, Dr. Linn consulted. Signed off   -LEFT removed on 10/1  -RIGHT removed on 10/2  -No recommendations for IR drain at this time per pulmonology and thoracic surgery  -Continue aggressive pulmonary toileting and ambulation as tolerated   -Continue cefepime   -Continue morphine PCA for pain control     Pneumonia due to COVID-19 virus- (present on admission)  Assessment & Plan  -Continue close clinical monitoring  -Previously treated with Decadron remdesivir and barcitinib       VTE prophylaxis: enoxaparin ppx    I have performed a physical exam and reviewed and updated ROS and Plan today (10/4/2021). In review of yesterday's note (10/3/2021), there are no changes except as documented above.

## 2021-10-04 NOTE — PROGRESS NOTES
Bedside shift report received from day shift RN. Patient is A+Ox4 and is resting comfortably. Patient states 3/10 pain and denies need for pain medication at this time. Patient updated on current plan of care and shift goals established. No further needs at this time. Bed locked in lowest position, upper bed rails up, call light and belongings within reach. Hourly rounding in place.     COVID 19 surge in effect.

## 2021-10-04 NOTE — PROGRESS NOTES
Bedside report received, assessment completed    A&O x  4, pt calls appropriately  Mobility: Up with SBA, no assistive devices  Fall Risk Assessment: low, bed alarm pt calls approprately  Pain Assessment: 2/10, medication provided per MAR  Diet: CHO  LDA:   IV Access: midline KOLBY, CDI/ flushed/ Infusing abx  * d/c PCA*  + void, + flatus, 10/3/21 BM  DVT Prophylaxis: +, SCD +    D/C Plan: pending medical clearance     Reviewed plan of care with patient, bed in lowest position and locked, pt resting comfortably now, call light within reach, all needs met at this time. Interventions will be executed per plan of care

## 2021-10-05 ENCOUNTER — APPOINTMENT (OUTPATIENT)
Dept: RADIOLOGY | Facility: MEDICAL CENTER | Age: 31
DRG: 199 | End: 2021-10-05
Attending: INTERNAL MEDICINE
Payer: COMMERCIAL

## 2021-10-05 LAB
ANION GAP SERPL CALC-SCNC: 10 MMOL/L (ref 7–16)
BASOPHILS # BLD AUTO: 0.4 % (ref 0–1.8)
BASOPHILS # BLD: 0.03 K/UL (ref 0–0.12)
BUN SERPL-MCNC: 9 MG/DL (ref 8–22)
CALCIUM SERPL-MCNC: 8.8 MG/DL (ref 8.5–10.5)
CHLORIDE SERPL-SCNC: 101 MMOL/L (ref 96–112)
CO2 SERPL-SCNC: 27 MMOL/L (ref 20–33)
CREAT SERPL-MCNC: 0.65 MG/DL (ref 0.5–1.4)
EOSINOPHIL # BLD AUTO: 0.25 K/UL (ref 0–0.51)
EOSINOPHIL NFR BLD: 3.7 % (ref 0–6.9)
ERYTHROCYTE [DISTWIDTH] IN BLOOD BY AUTOMATED COUNT: 45.6 FL (ref 35.9–50)
GLUCOSE BLD-MCNC: 105 MG/DL (ref 65–99)
GLUCOSE BLD-MCNC: 128 MG/DL (ref 65–99)
GLUCOSE BLD-MCNC: 155 MG/DL (ref 65–99)
GLUCOSE BLD-MCNC: 156 MG/DL (ref 65–99)
GLUCOSE SERPL-MCNC: 122 MG/DL (ref 65–99)
HCT VFR BLD AUTO: 24.6 % (ref 42–52)
HGB BLD-MCNC: 8 G/DL (ref 14–18)
IMM GRANULOCYTES # BLD AUTO: 0.05 K/UL (ref 0–0.11)
IMM GRANULOCYTES NFR BLD AUTO: 0.7 % (ref 0–0.9)
LYMPHOCYTES # BLD AUTO: 1.37 K/UL (ref 1–4.8)
LYMPHOCYTES NFR BLD: 20.3 % (ref 22–41)
MAGNESIUM SERPL-MCNC: 2.1 MG/DL (ref 1.5–2.5)
MCH RBC QN AUTO: 29.1 PG (ref 27–33)
MCHC RBC AUTO-ENTMCNC: 32.5 G/DL (ref 33.7–35.3)
MCV RBC AUTO: 89.5 FL (ref 81.4–97.8)
MONOCYTES # BLD AUTO: 0.66 K/UL (ref 0–0.85)
MONOCYTES NFR BLD AUTO: 9.8 % (ref 0–13.4)
NEUTROPHILS # BLD AUTO: 4.4 K/UL (ref 1.82–7.42)
NEUTROPHILS NFR BLD: 65.1 % (ref 44–72)
NRBC # BLD AUTO: 0 K/UL
NRBC BLD-RTO: 0 /100 WBC
NT-PROBNP SERPL IA-MCNC: 69 PG/ML (ref 0–125)
PLATELET # BLD AUTO: 384 K/UL (ref 164–446)
PMV BLD AUTO: 8.7 FL (ref 9–12.9)
POTASSIUM SERPL-SCNC: 3.9 MMOL/L (ref 3.6–5.5)
RBC # BLD AUTO: 2.75 M/UL (ref 4.7–6.1)
SODIUM SERPL-SCNC: 138 MMOL/L (ref 135–145)
WBC # BLD AUTO: 6.8 K/UL (ref 4.8–10.8)

## 2021-10-05 PROCEDURE — 700111 HCHG RX REV CODE 636 W/ 250 OVERRIDE (IP): Performed by: NURSE PRACTITIONER

## 2021-10-05 PROCEDURE — A9270 NON-COVERED ITEM OR SERVICE: HCPCS | Performed by: INTERNAL MEDICINE

## 2021-10-05 PROCEDURE — 82962 GLUCOSE BLOOD TEST: CPT | Mod: 91

## 2021-10-05 PROCEDURE — 80048 BASIC METABOLIC PNL TOTAL CA: CPT

## 2021-10-05 PROCEDURE — 700111 HCHG RX REV CODE 636 W/ 250 OVERRIDE (IP): Performed by: INTERNAL MEDICINE

## 2021-10-05 PROCEDURE — 83880 ASSAY OF NATRIURETIC PEPTIDE: CPT

## 2021-10-05 PROCEDURE — 85025 COMPLETE CBC W/AUTO DIFF WBC: CPT

## 2021-10-05 PROCEDURE — 99233 SBSQ HOSP IP/OBS HIGH 50: CPT | Performed by: NURSE PRACTITIONER

## 2021-10-05 PROCEDURE — 700105 HCHG RX REV CODE 258: Performed by: NURSE PRACTITIONER

## 2021-10-05 PROCEDURE — 700102 HCHG RX REV CODE 250 W/ 637 OVERRIDE(OP): Performed by: INTERNAL MEDICINE

## 2021-10-05 PROCEDURE — 97165 OT EVAL LOW COMPLEX 30 MIN: CPT

## 2021-10-05 PROCEDURE — 71045 X-RAY EXAM CHEST 1 VIEW: CPT

## 2021-10-05 PROCEDURE — 83735 ASSAY OF MAGNESIUM: CPT

## 2021-10-05 PROCEDURE — 770020 HCHG ROOM/CARE - TELE (206)

## 2021-10-05 PROCEDURE — 97161 PT EVAL LOW COMPLEX 20 MIN: CPT

## 2021-10-05 RX ADMIN — CEFEPIME 2 G: 2 INJECTION, POWDER, FOR SOLUTION INTRAVENOUS at 04:19

## 2021-10-05 RX ADMIN — ACETAMINOPHEN 650 MG: 325 TABLET, FILM COATED ORAL at 20:13

## 2021-10-05 RX ADMIN — ENOXAPARIN SODIUM 40 MG: 40 INJECTION SUBCUTANEOUS at 04:20

## 2021-10-05 RX ADMIN — INSULIN HUMAN 2 UNITS: 100 INJECTION, SOLUTION PARENTERAL at 17:25

## 2021-10-05 RX ADMIN — INSULIN HUMAN 2 UNITS: 100 INJECTION, SOLUTION PARENTERAL at 23:11

## 2021-10-05 RX ADMIN — CEFEPIME 2 G: 2 INJECTION, POWDER, FOR SOLUTION INTRAVENOUS at 17:20

## 2021-10-05 RX ADMIN — ACETAMINOPHEN 650 MG: 325 TABLET, FILM COATED ORAL at 02:40

## 2021-10-05 RX ADMIN — ACETAMINOPHEN 650 MG: 325 TABLET, FILM COATED ORAL at 14:19

## 2021-10-05 RX ADMIN — MUPIROCIN 1 APPLICATION: 20 OINTMENT TOPICAL at 17:28

## 2021-10-05 ASSESSMENT — COGNITIVE AND FUNCTIONAL STATUS - GENERAL
WALKING IN HOSPITAL ROOM: A LITTLE
STANDING UP FROM CHAIR USING ARMS: A LITTLE
CLIMB 3 TO 5 STEPS WITH RAILING: A LITTLE
DAILY ACTIVITIY SCORE: 23
SUGGESTED CMS G CODE MODIFIER DAILY ACTIVITY: CI
MOBILITY SCORE: 21
SUGGESTED CMS G CODE MODIFIER MOBILITY: CJ
HELP NEEDED FOR BATHING: A LITTLE

## 2021-10-05 ASSESSMENT — ENCOUNTER SYMPTOMS
WHEEZING: 0
DIZZINESS: 0
BLURRED VISION: 0
PALPITATIONS: 0
CHILLS: 0
MYALGIAS: 0
ABDOMINAL PAIN: 0
FEVER: 0
DOUBLE VISION: 0
NAUSEA: 0
COUGH: 1
CONSTIPATION: 0
SHORTNESS OF BREATH: 1
FOCAL WEAKNESS: 0
VOMITING: 0

## 2021-10-05 ASSESSMENT — GAIT ASSESSMENTS
DISTANCE (FEET): 20
DEVIATION: NO DEVIATION
DISTANCE (FEET): 20
GAIT LEVEL OF ASSIST: SUPERVISED

## 2021-10-05 ASSESSMENT — PAIN DESCRIPTION - PAIN TYPE
TYPE: ACUTE PAIN

## 2021-10-05 ASSESSMENT — ACTIVITIES OF DAILY LIVING (ADL): TOILETING: INDEPENDENT

## 2021-10-05 NOTE — PROGRESS NOTES
[facility] Medicine Daily Progress Note    Date of Service  10/5/2021    Chief Complaint  Heriberto Boggs is a 31 y.o. male admitted 9/28/2021 with respiratory distress    Hospital Course  Mr. Boggs is a 31-year-old male with a PMHx of DM T2, asthma, who developed COVID-19 viral pneumonia approximately 5 weeks ago and has had multiple admissions since then.  Patient was transferred over from [facility] to [facility] for advance care.  Patient was initially in the emergency department and had multiple hospitalization over the past month for complications secondary to COVID-19 pneumonia.    Admission on 9/22-9/25 diagnosed him with lower extremity DVT and was discharged on Xarelto and oxygen supplementation.    On 9/26, patient developed sudden onset of bilateral severe chest pain with worsening dyspnea and severe hypoxia.  Patient presented back to the emergency department where he was noted to have bilateral pneumothoraces and underwent chest tube placement and was started on remdesivir, vancomycin, cefdinir, and Decadron.  Patient also started to have hemoptysis and CT of the chest revealed bilateral large pneumatoceles with blood attenuation on each side as well as residual pneumothoraces left greater than right which she was subsequently transferred to our facility for thoracic surgery evaluation.    Patient initially admitted to the intensive care unit where broad-spectrum antibiotics was continued.  Anticoagulation was also discontinued secondary to his hemoptysis and CT findings.  Thoracic surgeon Dr. Montes was consulted which ordered a repeat CT on 9/29.  Patient still did complain of chest wall pain, and pain over chest tube sites and has been having productive cough with hemoptysis.  No fever, or chills.  Patient placed on 4L NC O2.  Patient also having intermittent nausea and poor appetite.  No melena, or rectal bleeding noted.  Patient is complaining of malaise and fatigue.  Bilateral chest tube  is on water seal as of 9/30.  Patient transferred into the surgical floor for further evaluation and monitoring.      Interval Problem Update  10/5:  Respiratory status stable on 6 L.  Continue to wean as tolerated.  Mild increase in pulmonary edema on X-ray.  BNP wnl.  Continue aggressive RT protocol.  Denies acute pain.      I have personally seen and examined the patient at bedside. I discussed the plan of care with patient and bedside RN.    Consultants/Specialty  pulmonary and thoracic surgery    Code Status  Full Code    Disposition  Patient is not medically cleared.   Anticipate discharge to D.  I have placed the appropriate orders for post-discharge needs.    Review of Systems  Review of Systems   Constitutional: Positive for malaise/fatigue. Negative for chills and fever.   HENT: Negative for congestion.    Eyes: Negative for blurred vision and double vision.   Respiratory: Positive for cough and shortness of breath. Negative for wheezing.    Cardiovascular: Negative for chest pain, palpitations and leg swelling.   Gastrointestinal: Negative for abdominal pain, constipation, nausea and vomiting.   Genitourinary: Negative for dysuria.   Musculoskeletal: Negative for myalgias.   Skin: Negative for itching and rash.   Neurological: Negative for dizziness and focal weakness.   All other systems reviewed and are negative.       Physical Exam  Temp:  [36.3 °C (97.4 °F)-36.9 °C (98.5 °F)] 36.3 °C (97.4 °F)  Pulse:  [90-97] 93  Resp:  [16-18] 18  BP: (111-125)/(73-89) 111/75  SpO2:  [92 %-97 %] 96 %    Physical Exam  Vitals and nursing note reviewed.   Constitutional:       General: He is not in acute distress.     Appearance: He is obese. He is ill-appearing.   HENT:      Head: Normocephalic and atraumatic.      Nose: Nose normal.      Mouth/Throat:      Mouth: Mucous membranes are moist.      Pharynx: No oropharyngeal exudate.   Eyes:      General:         Right eye: No discharge.         Left eye: No  discharge.      Conjunctiva/sclera: Conjunctivae normal.      Pupils: Pupils are equal, round, and reactive to light.   Cardiovascular:      Rate and Rhythm: Regular rhythm. Tachycardia present.      Pulses: Normal pulses.      Heart sounds: Normal heart sounds. No murmur heard.     Pulmonary:      Effort: No respiratory distress.      Breath sounds: No wheezing.      Comments: Bilateral crackles   Chest:      Chest wall: Tenderness present.   Abdominal:      General: Bowel sounds are normal. There is no distension.      Palpations: Abdomen is soft.      Tenderness: There is no abdominal tenderness. There is no guarding.       Musculoskeletal:         General: Tenderness present.      Cervical back: Normal range of motion and neck supple. No rigidity.      Right lower leg: Edema present.      Left lower leg: Edema present.   Skin:     General: Skin is warm and dry.      Capillary Refill: Capillary refill takes 2 to 3 seconds.      Coloration: Skin is not jaundiced or pale.   Neurological:      Mental Status: He is alert and oriented to person, place, and time. Mental status is at baseline.      GCS: GCS eye subscore is 3. GCS verbal subscore is 5. GCS motor subscore is 6.   Psychiatric:         Mood and Affect: Mood normal.         Fluids    Intake/Output Summary (Last 24 hours) at 10/5/2021 0946  Last data filed at 10/5/2021 0909  Gross per 24 hour   Intake 1720 ml   Output 1200 ml   Net 520 ml       Laboratory  Recent Labs     10/03/21  0530 10/03/21  0530 10/03/21  1616 10/04/21  0230 10/05/21  0252   WBC 7.5  --   --  7.9 6.8   RBC 2.55*  --   --  2.73* 2.75*   HEMOGLOBIN 7.3*   < > 8.0* 7.8* 8.0*   HEMATOCRIT 23.0*   < > 25.4* 24.6* 24.6*   MCV 90.2  --   --  90.1 89.5   MCH 28.6  --   --  28.6 29.1   MCHC 31.7*  --   --  31.7* 32.5*   RDW 46.1  --   --  46.4 45.6   PLATELETCT 348  --   --  412 384   MPV 8.9*  --   --  8.9* 8.7*    < > = values in this interval not displayed.     Recent Labs     10/03/21  0530  10/04/21  0230 10/05/21  0252   SODIUM 139 139 138   POTASSIUM 3.9 3.9 3.9   CHLORIDE 102 103 101   CO2 30 30 27   GLUCOSE 172* 154* 122*   BUN 8 11 9   CREATININE 0.55 0.67 0.65   CALCIUM 8.4* 8.6 8.8                   Imaging  DX-CHEST-LIMITED (1 VIEW)   Final Result         1.  Pulmonary edema and/or infiltrates, somewhat increased since prior study.   2.  Stable opacification along the left lateral chest wall, could represent pulmonary mass versus loculated effusion.      DX-CHEST-LIMITED (1 VIEW)   Final Result         1.  Pulmonary edema and/or infiltrates.   2.  Stable opacification along the left lateral chest wall, could represent pulmonary mass versus loculated effusion.      DX-CHEST-LIMITED (1 VIEW)   Final Result      1.  Stable predominantly right-sided airspace disease consistent with Covid 19 pneumonia.   2.  Stable left apical pneumothorax.      IR-MIDLINE CATHETER INSERTION WO GUIDANCE > AGE 3   Final Result                  Ultrasound-guided midline placement performed by qualified nursing staff    as above.          DX-CHEST-LIMITED (1 VIEW)   Final Result         No significant interval change.            DX-CHEST-PORTABLE (1 VIEW)   Final Result      1.  Unchanged bilateral pulmonary opacities.   2.  No pneumothorax is seen.      DX-CHEST-LIMITED (1 VIEW)   Final Result      1.  Unchanged BILATERAL pulmonary opacities which may reflect a combination of airspace disease and blood or other fluid within pneumatoceles. Necrotizing pneumonia is also a consideration.   2.  Small LEFT pneumothorax, unchanged      DX-CHEST-LIMITED (1 VIEW)   Final Result         Slightly improved airspace opacity in the right lung.         DX-CHEST-LIMITED (1 VIEW)   Final Result         No significant interval change. Left chest tube is in the left basilar hemithorax.      CT-CHEST (THORAX) WITH   Final Result      1.  Trace right and small left pneumothorax.   2.  There is a large left loculated high density fluid  collection, may be consolidated blood in a pneumatocele.   3.  There is a left chest tube with the tip in the pericardial fat. There is associated subcutaneous emphysema.   4.  There is a loculated high density right collection located posteriorly, similar in appearance to the collection on the left. There is a right pleural space drainage catheter with tip in the superior anterior pleura.   5.  Extensive mixed groundglass and consolidative bilateral pulmonary opacities.      Findings communicated with Dr. Fields by Dr. Verde at 4:41 PM 9/29/2021               DX-CHEST-LIMITED (1 VIEW)   Final Result      No significant interval change.      OUTSIDE IMAGES-US VASCULAR   Final Result      OUTSIDE IMAGES-DX CHEST   Final Result      CT-FOREIGN FILM CAT SCAN   Final Result      OUTSIDE IMAGES-DX CHEST   Final Result      OUTSIDE IMAGES-DX CHEST   Final Result      OUTSIDE IMAGES-DX CHEST   Final Result      OUTSIDE IMAGES-DX CHEST   Final Result      OUTSIDE IMAGES-DX CHEST   Final Result      OUTSIDE IMAGES-DX CHEST   Final Result      CT-FOREIGN FILM CAT SCAN   Final Result      DX-CHEST-LIMITED (1 VIEW)   Final Result         No significant interval change. Small left apical pneumothorax.      DX-CHEST-PORTABLE (1 VIEW)   Final Result         Left apical pneumothorax is seen. There is a bulging elliptical opacity in the left hemithorax which could relate to loculated fluid or hematoma.      CRITICAL RESULT READ BACK: Preliminary findings discussed with and critical read back performed by Dr. RADHA TAPIA JR via telephone on 9/28/2021 10:58 PM           Assessment/Plan  * Acute respiratory failure with hypoxia (HCC)- (present on admission)  Assessment & Plan  -Requiring 6L O2 at rest via oxymask   -CXR showing mildly increased edema with stable left opacification   -Trial 40mg lasix as he does appear hypervolemic on exam   -Continue on empiric cefepime.   -Procal negative  -BNP ordered.  -RT protocol  -Aggressive  pulm toileting   -PT/OT to consult     Sinus tachycardia- (present on admission)  Assessment & Plan  -Likely secondary to above   -Pain control   -hemodynamically stable   -Monitor     DVT (deep venous thrombosis) (HCC)- (present on admission)  Assessment & Plan  -Patient Xarelgordon has been on hold given hemoptysis and concern for bleeding in his pneumatoceles.  -Monitor H/H       Anemia associated with acute blood loss- (present on admission)  Assessment & Plan  -H/H stable  -Monitor hemoglobin and transfuse if less than 7      Type 2 diabetes mellitus without complication, without long-term current use of insulin (HCC)- (present on admission)  Assessment & Plan  Insulin sliding scale monitor CBGs.  Currently stable.          Bilateral pneumothoraces- (present on admission)  Assessment & Plan  -Pulmonology, Dr. Linn consulted. Signed off   -LEFT removed on 10/1  -RIGHT removed on 10/2  -No recommendations for IR drain at this time per pulmonology and thoracic surgery  -Continue aggressive pulmonary toileting and ambulation as tolerated   -Continue cefepime       Pneumonia due to COVID-19 virus- (present on admission)  Assessment & Plan  -Continue close clinical monitoring  -Previously treated with Decadron remdesivir and barcitinib  -Treating empirically with cefepime       VTE prophylaxis: enoxaparin ppx    I have performed a physical exam and reviewed and updated ROS and Plan today (10/5/2021). In review of yesterday's note (10/4/2021), there are no changes except as documented above.

## 2021-10-05 NOTE — CARE PLAN
Problem: Knowledge Deficit - Standard  Goal: Patient and family/care givers will demonstrate understanding of plan of care, disease process/condition, diagnostic tests and medications  Outcome: Progressing     Problem: Pain - Standard  Goal: Alleviation of pain or a reduction in pain to the patient’s comfort goal  Outcome: Progressing     Problem: Skin Integrity  Goal: Skin integrity is maintained or improved  Outcome: Progressing     Problem: Fall Risk  Goal: Patient will remain free from falls  Outcome: Progressing     The patient is {Patient Stability:[id number]}    Shift Goals  Clinical Goals: (P) Wean off O2  Patient Goals: (P) Pain Control  Family Goals: (P) N/A    Progress made toward(s) clinical / shift goals:  ***    Patient is not progressing towards the following goals:

## 2021-10-05 NOTE — CARE PLAN
Problem: Knowledge Deficit - Standard  Goal: Patient and family/care givers will demonstrate understanding of plan of care, disease process/condition, diagnostic tests and medications  Outcome: Progressing   O2 delivery education provided to patient   Problem: Pain - Standard  Goal: Alleviation of pain or a reduction in pain to the patient’s comfort goal  10/5/2021 1113 by Dudley Person, Student  Outcome: Progressing   Patient denies pain at this time   Problem: Skin Integrity  Goal: Skin integrity is maintained or improved  10/5/2021 1640 by Dudley Person, Student  Outcome: Progressing  Patient turns self in bed     Problem: Fall Risk  Goal: Patient will remain free from falls  10/5/2021 1640 by Dudley Person, Student  Outcome: Progressing  Pt ambulating in room independently      The patient is Stable - Low risk of patient condition declining or worsening    Shift Goals  Clinical Goals: Wean off O2  Patient Goals: Pain Control  Family Goals: N/A    Progress made toward(s) clinical / shift goals:  Pt on 3L O2 Mask, O2 Sat at 95%    Patient is not progressing towards the following goals:

## 2021-10-05 NOTE — PROGRESS NOTES
Assumed care of patient at 0645. Bedside report received.Assessment complete.  AA&Ox4. Denies CP/SOB.  Patient denies pain at this time, Declined intervention at this time.  Educated patient regarding pharmacologic and non pharmacologic modalities for pain management.  Skin per flowsheets  Tolerating diabetic diet. Denies N/V.  + void. + BM. Last BM 10/4  Pt ambulates self.  All needs met at this time. Call light within reach. Pt calls appropriately. Bed low and locked, non skid socks in place. Hourly rounding in place.

## 2021-10-05 NOTE — THERAPY
Physical Therapy   Initial Evaluation     Patient Name: Heriberto Boggs  Age:  31 y.o., Sex:  male  Medical Record #: [id number]  Today's Date: 10/5/2021    Assessment  Patient is a 31 y.o. male with B pneumothorax following COVID-19 infection in late August leading to PNA; pt hospitalized 5 times in past month due to SOB and decreased SpO2 at home. PT eval completed. Pt completed all mobility tasks at [facility]; all transfers with no AD and gait with no LOB. Pt limited in standing due to incr HR while completing hygiene tasks and moved to chair per therapist recommendation. Pt presents with decreased activity tolerance requiring constant monitoring of SpO2 and HR. Pt would benefit from continued therapy to increase activity tolerance and functional mobility to facilitate a safe transition home. Encouraged continued mobilization with nursing, PT will follow for stair training prior to DC when vital signs are more stable during activity. Will follow for acute PT needs.     Plan  Recommend Physical Therapy 2 times per week until therapy goals are met for the following treatments:  Gait Training, Self Care/Home Evaluation, Stair Training and Therapeutic Activities  DC Equipment Recommendations: Unable to determine at this time  Discharge Recommendations: Anticipate that the patient will have no further physical therapy needs after discharge from the hospital          10/05/21 1531   Vitals   Pulse (!) 117   Pulse Oximetry 93 %   O2 (LPM) 3   O2 Delivery Device Oxymask   Vitals Comments -122 at EOB, 120's to 130's during activity and then HR reached 146 w/o increased work or breathing hard, returned to EOB. SpO2 remained in low 90's throughout session with 1-2 instances of dropping to 88-89% during activity.   Pain 0 - 10 Group   Therapist Pain Assessment no c/o pain   Prior Living Situation   Prior Services None   Housing / Facility 1 Story Apartment / Condo   Steps Into Home 12 (FOS to 2nd story apartment)   Rail Right  Rail (Steps into Home)   Bathroom Set up Bathtub / Shower Combination   Equipment Owned Oxygen (NC and travel tank)   Lives with - Patient's Self Care Capacity Spouse;Parents;Child Less than 18 Years of Age;Other (Comments) (Brother and brother in law)   Comments family able to provide assist as needed   Prior Level of Functional Mobility   Bed Mobility Independent   Transfer Status Independent   Ambulation Independent   Distance Ambulation (Feet) community distances   Assistive Devices Used None   Stairs Independent   Comments levels for prior to initial hospitalization in late August   Cognition    Cognition / Consciousness WDL   Level of Consciousness Alert   Comments pleasant and cooperative. aware of appropriate SpO2 levels and how to manage O2   Active ROM Lower Body    Active ROM Lower Body  WDL   Strength Lower Body   Lower Body Strength  WDL   Balance Assessment   Sitting Balance (Static) Fair +   Sitting Balance (Dynamic) Fair +   Standing Balance (Static) Fair   Standing Balance (Dynamic) Fair   Weight Shift Sitting Good   Weight Shift Standing Good   Comments no assist   Gait Analysis   Gait Level Of Assist Supervised   Assistive Device None   Distance (Feet) 20   # of Times Distance was Traveled 2   Deviation No deviation   Weight Bearing Status no restrictions   Comments no LOB   Bed Mobility    Comments NT, EOB   Functional Mobility   Sit to Stand Supervised   Bed, Chair, Wheelchair Transfer Supervised   Comments no AD. pt reported slight lightheadedness upon standing initially   Short Term Goals    Short Term Goal # 1 pt will negotiate a FOS with R HR and SPV to facilitate home entry in 6 visits

## 2021-10-06 ENCOUNTER — APPOINTMENT (OUTPATIENT)
Dept: RADIOLOGY | Facility: MEDICAL CENTER | Age: 31
DRG: 199 | End: 2021-10-06
Attending: INTERNAL MEDICINE
Payer: COMMERCIAL

## 2021-10-06 LAB
ANION GAP SERPL CALC-SCNC: 11 MMOL/L (ref 7–16)
BASOPHILS # BLD AUTO: 0.6 % (ref 0–1.8)
BASOPHILS # BLD: 0.04 K/UL (ref 0–0.12)
BUN SERPL-MCNC: 10 MG/DL (ref 8–22)
CALCIUM SERPL-MCNC: 8.5 MG/DL (ref 8.5–10.5)
CHLORIDE SERPL-SCNC: 104 MMOL/L (ref 96–112)
CO2 SERPL-SCNC: 26 MMOL/L (ref 20–33)
CREAT SERPL-MCNC: 0.71 MG/DL (ref 0.5–1.4)
EOSINOPHIL # BLD AUTO: 0.3 K/UL (ref 0–0.51)
EOSINOPHIL NFR BLD: 4.7 % (ref 0–6.9)
ERYTHROCYTE [DISTWIDTH] IN BLOOD BY AUTOMATED COUNT: 47.1 FL (ref 35.9–50)
GLUCOSE BLD-MCNC: 128 MG/DL (ref 65–99)
GLUCOSE BLD-MCNC: 130 MG/DL (ref 65–99)
GLUCOSE BLD-MCNC: 198 MG/DL (ref 65–99)
GLUCOSE BLD-MCNC: 82 MG/DL (ref 65–99)
GLUCOSE SERPL-MCNC: 123 MG/DL (ref 65–99)
HCT VFR BLD AUTO: 26.8 % (ref 42–52)
HGB BLD-MCNC: 8.4 G/DL (ref 14–18)
IMM GRANULOCYTES # BLD AUTO: 0.04 K/UL (ref 0–0.11)
IMM GRANULOCYTES NFR BLD AUTO: 0.6 % (ref 0–0.9)
LYMPHOCYTES # BLD AUTO: 1.68 K/UL (ref 1–4.8)
LYMPHOCYTES NFR BLD: 26.5 % (ref 22–41)
MAGNESIUM SERPL-MCNC: 2.2 MG/DL (ref 1.5–2.5)
MCH RBC QN AUTO: 28.7 PG (ref 27–33)
MCHC RBC AUTO-ENTMCNC: 31.3 G/DL (ref 33.7–35.3)
MCV RBC AUTO: 91.5 FL (ref 81.4–97.8)
MONOCYTES # BLD AUTO: 0.78 K/UL (ref 0–0.85)
MONOCYTES NFR BLD AUTO: 12.3 % (ref 0–13.4)
NEUTROPHILS # BLD AUTO: 3.5 K/UL (ref 1.82–7.42)
NEUTROPHILS NFR BLD: 55.3 % (ref 44–72)
NRBC # BLD AUTO: 0 K/UL
NRBC BLD-RTO: 0 /100 WBC
PLATELET # BLD AUTO: 380 K/UL (ref 164–446)
PMV BLD AUTO: 8.8 FL (ref 9–12.9)
POTASSIUM SERPL-SCNC: 3.9 MMOL/L (ref 3.6–5.5)
RBC # BLD AUTO: 2.93 M/UL (ref 4.7–6.1)
SODIUM SERPL-SCNC: 141 MMOL/L (ref 135–145)
WBC # BLD AUTO: 6.3 K/UL (ref 4.8–10.8)

## 2021-10-06 PROCEDURE — 700102 HCHG RX REV CODE 250 W/ 637 OVERRIDE(OP): Performed by: NURSE PRACTITIONER

## 2021-10-06 PROCEDURE — 83735 ASSAY OF MAGNESIUM: CPT

## 2021-10-06 PROCEDURE — A9270 NON-COVERED ITEM OR SERVICE: HCPCS | Performed by: NURSE PRACTITIONER

## 2021-10-06 PROCEDURE — 700111 HCHG RX REV CODE 636 W/ 250 OVERRIDE (IP): Performed by: INTERNAL MEDICINE

## 2021-10-06 PROCEDURE — 71045 X-RAY EXAM CHEST 1 VIEW: CPT

## 2021-10-06 PROCEDURE — 85025 COMPLETE CBC W/AUTO DIFF WBC: CPT

## 2021-10-06 PROCEDURE — 700111 HCHG RX REV CODE 636 W/ 250 OVERRIDE (IP): Performed by: NURSE PRACTITIONER

## 2021-10-06 PROCEDURE — 80048 BASIC METABOLIC PNL TOTAL CA: CPT

## 2021-10-06 PROCEDURE — A9270 NON-COVERED ITEM OR SERVICE: HCPCS | Performed by: INTERNAL MEDICINE

## 2021-10-06 PROCEDURE — 700105 HCHG RX REV CODE 258: Performed by: NURSE PRACTITIONER

## 2021-10-06 PROCEDURE — 99233 SBSQ HOSP IP/OBS HIGH 50: CPT | Performed by: NURSE PRACTITIONER

## 2021-10-06 PROCEDURE — 82962 GLUCOSE BLOOD TEST: CPT | Mod: 91

## 2021-10-06 PROCEDURE — 700102 HCHG RX REV CODE 250 W/ 637 OVERRIDE(OP): Performed by: INTERNAL MEDICINE

## 2021-10-06 PROCEDURE — 770020 HCHG ROOM/CARE - TELE (206)

## 2021-10-06 PROCEDURE — 36415 COLL VENOUS BLD VENIPUNCTURE: CPT

## 2021-10-06 RX ADMIN — ENOXAPARIN SODIUM 40 MG: 40 INJECTION SUBCUTANEOUS at 06:28

## 2021-10-06 RX ADMIN — OXYCODONE 5 MG: 5 TABLET ORAL at 22:44

## 2021-10-06 RX ADMIN — OXYCODONE 5 MG: 5 TABLET ORAL at 18:24

## 2021-10-06 RX ADMIN — OXYCODONE 5 MG: 5 TABLET ORAL at 02:10

## 2021-10-06 RX ADMIN — ACETAMINOPHEN 650 MG: 325 TABLET, FILM COATED ORAL at 12:54

## 2021-10-06 RX ADMIN — MUPIROCIN 1 DOSE: 20 OINTMENT TOPICAL at 06:28

## 2021-10-06 RX ADMIN — DOCUSATE SODIUM 50 MG AND SENNOSIDES 8.6 MG 2 TABLET: 8.6; 5 TABLET, FILM COATED ORAL at 18:15

## 2021-10-06 RX ADMIN — MUPIROCIN 2 %: 20 OINTMENT TOPICAL at 18:15

## 2021-10-06 RX ADMIN — ENOXAPARIN SODIUM 40 MG: 40 INJECTION SUBCUTANEOUS at 18:14

## 2021-10-06 RX ADMIN — INSULIN HUMAN 2 UNITS: 100 INJECTION, SOLUTION PARENTERAL at 12:54

## 2021-10-06 RX ADMIN — CEFEPIME 2 G: 2 INJECTION, POWDER, FOR SOLUTION INTRAVENOUS at 06:26

## 2021-10-06 RX ADMIN — CEFEPIME 2 G: 2 INJECTION, POWDER, FOR SOLUTION INTRAVENOUS at 18:14

## 2021-10-06 ASSESSMENT — PAIN DESCRIPTION - PAIN TYPE
TYPE: ACUTE PAIN

## 2021-10-06 ASSESSMENT — ENCOUNTER SYMPTOMS
SPUTUM PRODUCTION: 0
DIZZINESS: 0
BLURRED VISION: 0
NAUSEA: 0
MYALGIAS: 0
FOCAL WEAKNESS: 0
COUGH: 1
SHORTNESS OF BREATH: 1
FEVER: 0
VOMITING: 0
ABDOMINAL PAIN: 0
DOUBLE VISION: 0
CONSTIPATION: 0
SORE THROAT: 0
WEAKNESS: 0
DIARRHEA: 0
EYE DISCHARGE: 0

## 2021-10-06 NOTE — CARE PLAN
The patient is Stable - Low risk of patient condition declining or worsening    Shift Goals  Clinical Goals: Wean off oxygen, pain control, rest  Patient Goals: Pain control, rest  Family Goals: NA    Progress made toward(s) clinical / shift goals:  Titrating oxygen between 4-5L. IS encouraged. Diminished LS. Mild SOB reported, noted with exertion. Patient stated expectorating. Medicated for HA with Tylenol; effective per patient report. Patient sleeping at present time.     Patient is not progressing towards the following goals: NA.

## 2021-10-06 NOTE — PROGRESS NOTES
[facility] Medicine Daily Progress Note    Date of Service  10/6/2021    Chief Complaint  Heriberto Boggs is a 31 y.o. male admitted 9/28/2021 with respiratory distress    Hospital Course  Mr. Boggs is a 31-year-old male with a PMHx of DM T2, asthma, who developed COVID-19 viral pneumonia approximately 5 weeks ago and has had multiple admissions since then.  Patient was transferred over from [facility] to [facility] for advance care.  Patient was initially in the emergency department and had multiple hospitalization over the past month for complications secondary to COVID-19 pneumonia.    Admission on 9/22-9/25 diagnosed him with lower extremity DVT and was discharged on Xarelto and oxygen supplementation.    On 9/26, patient developed sudden onset of bilateral severe chest pain with worsening dyspnea and severe hypoxia.  Patient presented back to the emergency department where he was noted to have bilateral pneumothoraces and underwent chest tube placement and was started on remdesivir, vancomycin, cefdinir, and Decadron.  Patient also started to have hemoptysis and CT of the chest revealed bilateral large pneumatoceles with blood attenuation on each side as well as residual pneumothoraces left greater than right which she was subsequently transferred to our facility for thoracic surgery evaluation.    Patient initially admitted to the intensive care unit where broad-spectrum antibiotics was continued.  Anticoagulation was also discontinued secondary to his hemoptysis and CT findings.  Thoracic surgeon Dr. Montes was consulted which ordered a repeat CT on 9/29.  Patient still did complain of chest wall pain, and pain over chest tube sites and has been having productive cough with hemoptysis.  No fever, or chills.  Patient placed on 4L NC O2.  Patient also having intermittent nausea and poor appetite.  No melena, or rectal bleeding noted.  Patient is complaining of malaise and fatigue.  Bilateral chest tube  is on water seal as of 9/30.  Patient transferred into the surgical floor for further evaluation and monitoring.      Interval Problem Update  10/5:  Respiratory status stable on 6 L.  Continue to wean as tolerated.  Mild increase in pulmonary edema on X-ray.  BNP wnl.  Continue aggressive RT protocol.  Denies acute pain.    10/6:  CXR improved today.  Weaning o2, now stable on 5L.  Reports his shortness of breath is overall much improved.  Hgb stable.  Increase lovenox to BID in setting of know DVT.      I have personally seen and examined the patient at bedside. I discussed the plan of care with patient and bedside RN.    Consultants/Specialty  pulmonary and thoracic surgery    Code Status  Full Code    Disposition  Patient is not medically cleared.   Anticipate discharge to TBD.  I have placed the appropriate orders for post-discharge needs.    Review of Systems  Review of Systems   Constitutional: Positive for malaise/fatigue. Negative for fever.   HENT: Negative for congestion and sore throat.    Eyes: Negative for blurred vision, double vision and discharge.   Respiratory: Positive for cough and shortness of breath. Negative for sputum production.    Cardiovascular: Negative for chest pain and leg swelling.   Gastrointestinal: Negative for abdominal pain, constipation, diarrhea, nausea and vomiting.   Genitourinary: Negative for dysuria and hematuria.   Musculoskeletal: Negative for joint pain and myalgias.   Skin: Negative for itching and rash.   Neurological: Negative for dizziness, focal weakness and weakness.   All other systems reviewed and are negative.       Physical Exam  Temp:  [36.3 °C (97.4 °F)-37.6 °C (99.6 °F)] 36.3 °C (97.4 °F)  Pulse:  [] 87  Resp:  [18-20] 20  BP: (104-126)/(71-80) 104/71  SpO2:  [92 %-98 %] 95 %    Physical Exam  Vitals and nursing note reviewed.   Constitutional:       General: He is not in acute distress.     Appearance: He is obese. He is ill-appearing. He is not  toxic-appearing.   HENT:      Head: Normocephalic and atraumatic.      Nose: Nose normal. No congestion.      Mouth/Throat:      Mouth: Mucous membranes are moist.      Pharynx: No oropharyngeal exudate.   Eyes:      General: No scleral icterus.        Right eye: No discharge.         Left eye: No discharge.      Conjunctiva/sclera: Conjunctivae normal.      Pupils: Pupils are equal, round, and reactive to light.   Cardiovascular:      Rate and Rhythm: Regular rhythm. Tachycardia present.      Pulses: Normal pulses.      Heart sounds: Normal heart sounds. No murmur heard.     Pulmonary:      Effort: No respiratory distress.      Breath sounds: Examination of the right-lower field reveals decreased breath sounds. Examination of the left-lower field reveals decreased breath sounds. Decreased breath sounds present. No wheezing.      Comments: Bilateral crackles   Abdominal:      General: Bowel sounds are normal. There is no distension.      Palpations: Abdomen is soft.      Tenderness: There is no abdominal tenderness.       Musculoskeletal:      Cervical back: Normal range of motion and neck supple. No rigidity or tenderness.      Right lower leg: Edema present.      Left lower leg: Edema present.   Skin:     General: Skin is warm and dry.      Capillary Refill: Capillary refill takes 2 to 3 seconds.      Coloration: Skin is not jaundiced or pale.   Neurological:      Mental Status: He is alert and oriented to person, place, and time. Mental status is at baseline.      GCS: GCS eye subscore is 3. GCS verbal subscore is 5. GCS motor subscore is 6.   Psychiatric:         Mood and Affect: Mood normal.         Fluids    Intake/Output Summary (Last 24 hours) at 10/6/2021 0754  Last data filed at 10/6/2021 0400  Gross per 24 hour   Intake 960 ml   Output 2645 ml   Net -1685 ml       Laboratory  Recent Labs     10/04/21  0230 10/05/21  0252 10/06/21  0425   WBC 7.9 6.8 6.3   RBC 2.73* 2.75* 2.93*   HEMOGLOBIN 7.8* 8.0* 8.4*    HEMATOCRIT 24.6* 24.6* 26.8*   MCV 90.1 89.5 91.5   MCH 28.6 29.1 28.7   MCHC 31.7* 32.5* 31.3*   RDW 46.4 45.6 47.1   PLATELETCT 412 384 380   MPV 8.9* 8.7* 8.8*     Recent Labs     10/04/21  0230 10/05/21  0252 10/06/21  0425   SODIUM 139 138 141   POTASSIUM 3.9 3.9 3.9   CHLORIDE 103 101 104   CO2 30 27 26   GLUCOSE 154* 122* 123*   BUN 11 9 10   CREATININE 0.67 0.65 0.71   CALCIUM 8.6 8.8 8.5                   Imaging  DX-CHEST-LIMITED (1 VIEW)   Final Result         1.  Pulmonary edema and/or infiltrates, decreased since prior study.   2.  Stable opacification along the left lateral chest wall, could represent pulmonary mass versus loculated effusion.      DX-CHEST-LIMITED (1 VIEW)   Final Result         1.  Pulmonary edema and/or infiltrates, somewhat increased since prior study.   2.  Stable opacification along the left lateral chest wall, could represent pulmonary mass versus loculated effusion.      DX-CHEST-LIMITED (1 VIEW)   Final Result         1.  Pulmonary edema and/or infiltrates.   2.  Stable opacification along the left lateral chest wall, could represent pulmonary mass versus loculated effusion.      DX-CHEST-LIMITED (1 VIEW)   Final Result      1.  Stable predominantly right-sided airspace disease consistent with Covid 19 pneumonia.   2.  Stable left apical pneumothorax.      IR-MIDLINE CATHETER INSERTION WO GUIDANCE > AGE 3   Final Result                  Ultrasound-guided midline placement performed by qualified nursing staff    as above.          DX-CHEST-LIMITED (1 VIEW)   Final Result         No significant interval change.            DX-CHEST-PORTABLE (1 VIEW)   Final Result      1.  Unchanged bilateral pulmonary opacities.   2.  No pneumothorax is seen.      DX-CHEST-LIMITED (1 VIEW)   Final Result      1.  Unchanged BILATERAL pulmonary opacities which may reflect a combination of airspace disease and blood or other fluid within pneumatoceles. Necrotizing pneumonia is also a  consideration.   2.  Small LEFT pneumothorax, unchanged      DX-CHEST-LIMITED (1 VIEW)   Final Result         Slightly improved airspace opacity in the right lung.         DX-CHEST-LIMITED (1 VIEW)   Final Result         No significant interval change. Left chest tube is in the left basilar hemithorax.      CT-CHEST (THORAX) WITH   Final Result      1.  Trace right and small left pneumothorax.   2.  There is a large left loculated high density fluid collection, may be consolidated blood in a pneumatocele.   3.  There is a left chest tube with the tip in the pericardial fat. There is associated subcutaneous emphysema.   4.  There is a loculated high density right collection located posteriorly, similar in appearance to the collection on the left. There is a right pleural space drainage catheter with tip in the superior anterior pleura.   5.  Extensive mixed groundglass and consolidative bilateral pulmonary opacities.      Findings communicated with Dr. Fields by Dr. Verde at 4:41 PM 9/29/2021               DX-CHEST-LIMITED (1 VIEW)   Final Result      No significant interval change.      OUTSIDE IMAGES-US VASCULAR   Final Result      OUTSIDE IMAGES-DX CHEST   Final Result      CT-FOREIGN FILM CAT SCAN   Final Result      OUTSIDE IMAGES-DX CHEST   Final Result      OUTSIDE IMAGES-DX CHEST   Final Result      OUTSIDE IMAGES-DX CHEST   Final Result      OUTSIDE IMAGES-DX CHEST   Final Result      OUTSIDE IMAGES-DX CHEST   Final Result      OUTSIDE IMAGES-DX CHEST   Final Result      CT-FOREIGN FILM CAT SCAN   Final Result      DX-CHEST-LIMITED (1 VIEW)   Final Result         No significant interval change. Small left apical pneumothorax.      DX-CHEST-PORTABLE (1 VIEW)   Final Result         Left apical pneumothorax is seen. There is a bulging elliptical opacity in the left hemithorax which could relate to loculated fluid or hematoma.      CRITICAL RESULT READ BACK: Preliminary findings discussed with and critical read  back performed by Dr. RADHA TAPIA JR via telephone on 9/28/2021 10:58 PM           Assessment/Plan  * Acute respiratory failure with hypoxia (HCC)- (present on admission)  Assessment & Plan  -Requiring 5L O2 at rest via oxymask   -CXR showing decreased edema with stable left opacification   -Trial 40mg lasix as he does appear hypervolemic on exam   -Continue on empiric cefepime.   -Procal negative  -BNP wnl  -RT protocol  -Aggressive pulm toileting   -PT/OT to consult   -Continue to wean o2 as tolerated.     Sinus tachycardia- (present on admission)  Assessment & Plan  -Likely secondary to above   -Pain control   -hemodynamically stable   -Monitor     DVT (deep venous thrombosis) (HCC)- (present on admission)  Assessment & Plan  -Continue to hold xarelto in case of surgery.  Initiate lovenox 40 mg BID.  Resume xarelto at discharge .      Anemia associated with acute blood loss- (present on admission)  Assessment & Plan  -H/H stable  -Monitor hemoglobin and transfuse if less than 7      Type 2 diabetes mellitus without complication, without long-term current use of insulin (HCC)- (present on admission)  Assessment & Plan  Insulin sliding scale monitor CBGs.  Currently stable.          Bilateral pneumothoraces- (present on admission)  Assessment & Plan  -Pulmonology, Dr. Linn consulted. Signed off   -LEFT removed on 10/1  -RIGHT removed on 10/2  -No recommendations for IR drain at this time per pulmonology and thoracic surgery  -Continue aggressive pulmonary toileting and ambulation as tolerated   -Continue cefepime       Pneumonia due to COVID-19 virus- (present on admission)  Assessment & Plan  -Continue close clinical monitoring  -Previously treated with Decadron remdesivir and barcitinib  -Treating empirically with cefepime       VTE prophylaxis: enoxaparin ppx    I have performed a physical exam and reviewed and updated ROS and Plan today (10/6/2021). In review of yesterday's note (10/5/2021), there are no  changes except as documented above.

## 2021-10-06 NOTE — PROGRESS NOTES
"Received report from AM RN; assumed care. A&O x 4. VSS, intermittently tachycardic 110-120's. Telemetry monitoring in place. 95% on 5L oxymask. Patient was titrated to 4L during daytime maintaining saturations per report. Increased to 5L d/t shallow/increased WOB after showering. IS encouraged; patient stated occasionally hitting 1000 meagn. Patient reported mild SOB, numbness/tingling extremities and sacrum; patient repositioning frequently. Denied nausea, vomiting, dizziness. Patient stated tolerating diet, \"eating a lot\". Educated about hypermetabolic state. Verbalized understanding. Dressings changed to former CT sites; CDI dressing. Small amount of serosang drainage noted on left former CT site. + void. + eructation. + flatus. LBM x 2 10/05. Patient up SBA/self dependent upon WOB; steady gait noted. Midline PIV, CDI dressing. Patient tolerating IV antibiotics. Patient mentioned white film on tongue that he scraped off. Red tongue noted. Encouraged patient to continued brushing four times a day and if continues, MD will be notified. POC discussed. Questions answered. Bed locked/lowest position. Call light/personal belongings within reach. All needs met/patient sleeping at present time.   "

## 2021-10-06 NOTE — THERAPY
"Occupational Therapy   Initial Evaluation     Patient Name: Heriberto Boggs  Age:  31 y.o., Sex:  male  Medical Record #: [id number]  Today's Date: 10/5/2021          Assessment    Patient is 31 y.o. male with h/o DMII, asthma, COVID PNA, DVTs, with multiple recent admits to OSH, transferred to [facility] for B PTX requiring chest tubes. Pt seen for OT eval. Pt able to mobilize in room and complete BADL with no more than supv. SpO2 ranged from 89-94% on 3L mask with -147. Pt stood at sink for several minutes to brush hair. Denied increased WOB, maintained SpO2, but HR steadily increased to mid-140s requiring return to sitting. Educated pt on use of shower chair for energy conservation. Pt reports good support from family. Patient will not be actively followed for occupational therapy services at this time, however may be seen if requested by physician for 1 more visit within 30 days to address any discharge or equipment needs.     Plan    Recommend Occupational Therapy for Evaluation only. DC needs only.     DC Equipment Recommendations: Tub / Shower Seat  Discharge Recommendations: Anticipate that the patient will have no further occupational therapy needs after discharge from the hospital     Subjective    \"I'm not as short of breath as I had been\"     Objective       10/05/21 1532   Prior Living Situation   Prior Services None;Home-Independent   Housing / Facility 2 Story Apartment / Condo   Steps Into Home   (full flight )   Rail Right Rail (Steps into Home)   Elevator No   Bathroom Set up Bathtub / Shower Combination;Shower Glass Doors   Equipment Owned Oxygen   Comments Pt lives with spouse , mom, brother-in-law, brother and kids. Report family can assist with I-ADL as needed.    Prior Level of ADL Function   Comments Pt was independent with BADL prior to initial admit to OSH    Prior Level of IADL Function   Comments Pt was independent with I-ADL and functional mobility prior to initial admit to OSH    Vitals   O2 " (LPM) 3   O2 Delivery Device Oxymask   Vitals Comments SpO2 89-94% with -147   Balance Assessment   Sitting Balance (Static) Good   Sitting Balance (Dynamic) Fair +   Standing Balance (Static) Fair +   Standing Balance (Dynamic) Fair   Weight Shift Sitting Good   Weight Shift Standing Good   Comments no AD, no LOB   Bed Mobility    Scooting Modified Independent  (seated)   Comments EOB pre, up to couch post    ADL Assessment   Grooming Modified Independent;Seated;Standing  (hair brushing )   Lower Body Dressing Modified Independent  (don B socks )   Toileting   (NT; declined need)   Functional Mobility   Sit to Stand Supervised   Bed, Chair, Wheelchair Transfer Supervised   Mobility Short gait and transfers in room

## 2021-10-07 ENCOUNTER — APPOINTMENT (OUTPATIENT)
Dept: RADIOLOGY | Facility: MEDICAL CENTER | Age: 31
DRG: 199 | End: 2021-10-07
Attending: INTERNAL MEDICINE
Payer: COMMERCIAL

## 2021-10-07 LAB
ANION GAP SERPL CALC-SCNC: 9 MMOL/L (ref 7–16)
BASOPHILS # BLD AUTO: 0.6 % (ref 0–1.8)
BASOPHILS # BLD: 0.04 K/UL (ref 0–0.12)
BUN SERPL-MCNC: 11 MG/DL (ref 8–22)
CALCIUM SERPL-MCNC: 9 MG/DL (ref 8.5–10.5)
CHLORIDE SERPL-SCNC: 103 MMOL/L (ref 96–112)
CO2 SERPL-SCNC: 27 MMOL/L (ref 20–33)
CREAT SERPL-MCNC: 0.6 MG/DL (ref 0.5–1.4)
EOSINOPHIL # BLD AUTO: 0.28 K/UL (ref 0–0.51)
EOSINOPHIL NFR BLD: 4.4 % (ref 0–6.9)
ERYTHROCYTE [DISTWIDTH] IN BLOOD BY AUTOMATED COUNT: 47.3 FL (ref 35.9–50)
GLUCOSE BLD-MCNC: 109 MG/DL (ref 65–99)
GLUCOSE BLD-MCNC: 120 MG/DL (ref 65–99)
GLUCOSE BLD-MCNC: 155 MG/DL (ref 65–99)
GLUCOSE BLD-MCNC: 161 MG/DL (ref 65–99)
GLUCOSE BLD-MCNC: 98 MG/DL (ref 65–99)
GLUCOSE SERPL-MCNC: 133 MG/DL (ref 65–99)
HCT VFR BLD AUTO: 27.3 % (ref 42–52)
HGB BLD-MCNC: 8.7 G/DL (ref 14–18)
IMM GRANULOCYTES # BLD AUTO: 0.04 K/UL (ref 0–0.11)
IMM GRANULOCYTES NFR BLD AUTO: 0.6 % (ref 0–0.9)
LYMPHOCYTES # BLD AUTO: 1.78 K/UL (ref 1–4.8)
LYMPHOCYTES NFR BLD: 27.7 % (ref 22–41)
MAGNESIUM SERPL-MCNC: 2.3 MG/DL (ref 1.5–2.5)
MCH RBC QN AUTO: 28.8 PG (ref 27–33)
MCHC RBC AUTO-ENTMCNC: 31.9 G/DL (ref 33.7–35.3)
MCV RBC AUTO: 90.4 FL (ref 81.4–97.8)
MONOCYTES # BLD AUTO: 0.62 K/UL (ref 0–0.85)
MONOCYTES NFR BLD AUTO: 9.7 % (ref 0–13.4)
NEUTROPHILS # BLD AUTO: 3.66 K/UL (ref 1.82–7.42)
NEUTROPHILS NFR BLD: 57 % (ref 44–72)
NRBC # BLD AUTO: 0 K/UL
NRBC BLD-RTO: 0 /100 WBC
PLATELET # BLD AUTO: 356 K/UL (ref 164–446)
PMV BLD AUTO: 8.6 FL (ref 9–12.9)
POTASSIUM SERPL-SCNC: 4 MMOL/L (ref 3.6–5.5)
RBC # BLD AUTO: 3.02 M/UL (ref 4.7–6.1)
SODIUM SERPL-SCNC: 139 MMOL/L (ref 135–145)
WBC # BLD AUTO: 6.4 K/UL (ref 4.8–10.8)

## 2021-10-07 PROCEDURE — 82962 GLUCOSE BLOOD TEST: CPT | Mod: 91

## 2021-10-07 PROCEDURE — 99232 SBSQ HOSP IP/OBS MODERATE 35: CPT | Performed by: NURSE PRACTITIONER

## 2021-10-07 PROCEDURE — 700111 HCHG RX REV CODE 636 W/ 250 OVERRIDE (IP): Performed by: NURSE PRACTITIONER

## 2021-10-07 PROCEDURE — 85025 COMPLETE CBC W/AUTO DIFF WBC: CPT

## 2021-10-07 PROCEDURE — 83735 ASSAY OF MAGNESIUM: CPT

## 2021-10-07 PROCEDURE — 770020 HCHG ROOM/CARE - TELE (206)

## 2021-10-07 PROCEDURE — A9270 NON-COVERED ITEM OR SERVICE: HCPCS | Performed by: INTERNAL MEDICINE

## 2021-10-07 PROCEDURE — 80048 BASIC METABOLIC PNL TOTAL CA: CPT

## 2021-10-07 PROCEDURE — 36415 COLL VENOUS BLD VENIPUNCTURE: CPT

## 2021-10-07 PROCEDURE — 700102 HCHG RX REV CODE 250 W/ 637 OVERRIDE(OP): Performed by: INTERNAL MEDICINE

## 2021-10-07 PROCEDURE — 700105 HCHG RX REV CODE 258: Performed by: NURSE PRACTITIONER

## 2021-10-07 PROCEDURE — 71045 X-RAY EXAM CHEST 1 VIEW: CPT

## 2021-10-07 RX ORDER — DEXTROSE MONOHYDRATE 25 G/50ML
50 INJECTION, SOLUTION INTRAVENOUS
Status: DISCONTINUED | OUTPATIENT
Start: 2021-10-07 | End: 2021-10-08 | Stop reason: HOSPADM

## 2021-10-07 RX ADMIN — MUPIROCIN 1 APPLICATION: 20 OINTMENT TOPICAL at 18:01

## 2021-10-07 RX ADMIN — ACETAMINOPHEN 650 MG: 325 TABLET, FILM COATED ORAL at 18:08

## 2021-10-07 RX ADMIN — ENOXAPARIN SODIUM 40 MG: 40 INJECTION SUBCUTANEOUS at 18:01

## 2021-10-07 RX ADMIN — INSULIN HUMAN 2 UNITS: 100 INJECTION, SOLUTION PARENTERAL at 00:19

## 2021-10-07 RX ADMIN — BENZOCAINE AND MENTHOL 1 LOZENGE: 15; 3.6 LOZENGE ORAL at 05:30

## 2021-10-07 RX ADMIN — DOCUSATE SODIUM 50 MG AND SENNOSIDES 8.6 MG 2 TABLET: 8.6; 5 TABLET, FILM COATED ORAL at 05:27

## 2021-10-07 RX ADMIN — CEFEPIME 2 G: 2 INJECTION, POWDER, FOR SOLUTION INTRAVENOUS at 18:01

## 2021-10-07 RX ADMIN — ENOXAPARIN SODIUM 40 MG: 40 INJECTION SUBCUTANEOUS at 05:30

## 2021-10-07 RX ADMIN — DOCUSATE SODIUM 50 MG AND SENNOSIDES 8.6 MG 2 TABLET: 8.6; 5 TABLET, FILM COATED ORAL at 18:01

## 2021-10-07 RX ADMIN — MUPIROCIN 2 DOSE: 20 OINTMENT TOPICAL at 05:31

## 2021-10-07 RX ADMIN — CEFEPIME 2 G: 2 INJECTION, POWDER, FOR SOLUTION INTRAVENOUS at 05:30

## 2021-10-07 ASSESSMENT — ENCOUNTER SYMPTOMS
COUGH: 1
DOUBLE VISION: 0
FEVER: 0
NAUSEA: 0
SHORTNESS OF BREATH: 1
DIARRHEA: 0
HEMOPTYSIS: 1
WHEEZING: 0
SORE THROAT: 0
EYE DISCHARGE: 0
MYALGIAS: 0
SPUTUM PRODUCTION: 0
DIZZINESS: 0
CHILLS: 0
FOCAL WEAKNESS: 0
BLURRED VISION: 0
SPUTUM PRODUCTION: 1
WEAKNESS: 0
ABDOMINAL PAIN: 0
CONSTIPATION: 0
SHORTNESS OF BREATH: 0
LOSS OF CONSCIOUSNESS: 0
VOMITING: 0

## 2021-10-07 ASSESSMENT — PAIN DESCRIPTION - PAIN TYPE
TYPE: ACUTE PAIN

## 2021-10-07 NOTE — PROGRESS NOTES
Pt is AO x 4  Pt. Is currently on 1L oxymask at rest.  Pt. Ambulated with Pearl RODRIGUEZ 2 laps totaling 1000ft. Pt tolerated well, oxygen titrated up to 4L oxymask in order to maintain 91% 02 during ambulation.   Patient does report some dizziness during position change but denies during ambulation.     Pt. Is currently resting in bed, bed locked in lowest position. Patient states 6/10 pain on right rib cage. Medicated per MAR per pt. Request.  Call light in place, will continue to monitor.

## 2021-10-07 NOTE — PROGRESS NOTES
[facility] Medicine Daily Progress Note    Date of Service  10/7/2021    Chief Complaint  Heriberto Boggs is a 31 y.o. male admitted 9/28/2021 with respiratory distress    Hospital Course  Mr. Boggs is a 31-year-old male with a PMHx of DM T2, asthma, who developed COVID-19 viral pneumonia approximately 5 weeks ago and has had multiple admissions since then.  Patient was transferred over from [facility] to [facility] for advance care.  Patient was initially in the emergency department and had multiple hospitalization over the past month for complications secondary to COVID-19 pneumonia.    Admission on 9/22-9/25 diagnosed him with lower extremity DVT and was discharged on Xarelto and oxygen supplementation.    On 9/26, patient developed sudden onset of bilateral severe chest pain with worsening dyspnea and severe hypoxia.  Patient presented back to the emergency department where he was noted to have bilateral pneumothoraces and underwent chest tube placement and was started on remdesivir, vancomycin, cefdinir, and Decadron.  Patient also started to have hemoptysis and CT of the chest revealed bilateral large pneumatoceles with blood attenuation on each side as well as residual pneumothoraces left greater than right which she was subsequently transferred to our facility for thoracic surgery evaluation.    Patient initially admitted to the intensive care unit where broad-spectrum antibiotics was continued.  Anticoagulation was also discontinued secondary to his hemoptysis and CT findings.  Thoracic surgeon Dr. Montes was consulted which ordered a repeat CT on 9/29.  Patient still did complain of chest wall pain, and pain over chest tube sites and has been having productive cough with hemoptysis.  No fever, or chills.  Patient placed on 4L NC O2.  Patient also having intermittent nausea and poor appetite.  No melena, or rectal bleeding noted.  Patient is complaining of malaise and fatigue.  Bilateral chest tube  is on water seal as of 9/30.  Patient transferred into the surgical floor for further evaluation and monitoring.      Interval Problem Update  10/5:  Respiratory status stable on 6 L.  Continue to wean as tolerated.  Mild increase in pulmonary edema on X-ray.  BNP wnl.  Continue aggressive RT protocol.  Denies acute pain.    10/6:  CXR improved today.  Weaning o2, now stable on 5L.  Reports his shortness of breath is overall much improved.  Hgb stable.  Increase lovenox to BID in setting of know DVT.      I have personally seen and examined the patient at bedside. I discussed the plan of care with patient and bedside RN.    Consultants/Specialty  pulmonary and thoracic surgery    Code Status  Full Code    Disposition  Patient is not medically cleared.   Anticipate discharge to TBD.  I have placed the appropriate orders for post-discharge needs.    Review of Systems  Review of Systems   Constitutional: Positive for malaise/fatigue. Negative for fever.   HENT: Negative for congestion and sore throat.    Eyes: Negative for blurred vision, double vision and discharge.   Respiratory: Positive for cough and shortness of breath. Negative for sputum production.    Cardiovascular: Negative for chest pain and leg swelling.   Gastrointestinal: Negative for abdominal pain, constipation, diarrhea, nausea and vomiting.   Genitourinary: Negative for dysuria and hematuria.   Musculoskeletal: Negative for joint pain and myalgias.   Skin: Negative for itching and rash.   Neurological: Negative for dizziness, focal weakness and weakness.   All other systems reviewed and are negative.       Physical Exam  Temp:  [37.1 °C (98.8 °F)] 37.1 °C (98.8 °F)  Pulse:  [100] 100  Resp:  [18] 18  BP: (119)/(74) 119/74  SpO2:  [93 %] 93 %    Physical Exam  Vitals and nursing note reviewed.   Constitutional:       General: He is not in acute distress.     Appearance: He is obese. He is ill-appearing. He is not toxic-appearing.   HENT:      Head:  Normocephalic and atraumatic.      Nose: Nose normal. No congestion.      Mouth/Throat:      Mouth: Mucous membranes are moist.      Pharynx: No oropharyngeal exudate.   Eyes:      General: No scleral icterus.        Right eye: No discharge.         Left eye: No discharge.      Conjunctiva/sclera: Conjunctivae normal.      Pupils: Pupils are equal, round, and reactive to light.   Cardiovascular:      Rate and Rhythm: Regular rhythm. Tachycardia present.      Pulses: Normal pulses.      Heart sounds: Normal heart sounds. No murmur heard.     Pulmonary:      Effort: No respiratory distress.      Breath sounds: Examination of the right-lower field reveals decreased breath sounds. Examination of the left-lower field reveals decreased breath sounds. Decreased breath sounds present. No wheezing.      Comments: Bilateral crackles   Abdominal:      General: Bowel sounds are normal. There is no distension.      Palpations: Abdomen is soft.      Tenderness: There is no abdominal tenderness.       Musculoskeletal:      Cervical back: Normal range of motion and neck supple. No rigidity or tenderness.      Right lower leg: Edema present.      Left lower leg: Edema present.   Skin:     General: Skin is warm and dry.      Capillary Refill: Capillary refill takes 2 to 3 seconds.      Coloration: Skin is not jaundiced or pale.   Neurological:      Mental Status: He is alert and oriented to person, place, and time. Mental status is at baseline.      GCS: GCS eye subscore is 3. GCS verbal subscore is 5. GCS motor subscore is 6.   Psychiatric:         Mood and Affect: Mood normal.         Fluids    Intake/Output Summary (Last 24 hours) at 10/7/2021 1458  Last data filed at 10/7/2021 0600  Gross per 24 hour   Intake 900 ml   Output no documentation   Net 900 ml       Laboratory  Recent Labs     10/05/21  0252 10/06/21  0425 10/07/21  0346   WBC 6.8 6.3 6.4   RBC 2.75* 2.93* 3.02*   HEMOGLOBIN 8.0* 8.4* 8.7*   HEMATOCRIT 24.6* 26.8*  27.3*   MCV 89.5 91.5 90.4   MCH 29.1 28.7 28.8   MCHC 32.5* 31.3* 31.9*   RDW 45.6 47.1 47.3   PLATELETCT 384 380 356   MPV 8.7* 8.8* 8.6*     Recent Labs     10/05/21  0252 10/06/21  0425 10/07/21  0346   SODIUM 138 141 139   POTASSIUM 3.9 3.9 4.0   CHLORIDE 101 104 103   CO2 27 26 27   GLUCOSE 122* 123* 133*   BUN 9 10 11   CREATININE 0.65 0.71 0.60   CALCIUM 8.8 8.5 9.0                   Imaging  DX-CHEST-LIMITED (1 VIEW)   Final Result         1.  Pulmonary edema and/or infiltrates, stable since prior study.   2.  Stable opacification along the left lateral chest wall, could represent pulmonary mass versus loculated effusion.      DX-CHEST-LIMITED (1 VIEW)   Final Result         1.  Pulmonary edema and/or infiltrates, decreased since prior study.   2.  Stable opacification along the left lateral chest wall, could represent pulmonary mass versus loculated effusion.      DX-CHEST-LIMITED (1 VIEW)   Final Result         1.  Pulmonary edema and/or infiltrates, somewhat increased since prior study.   2.  Stable opacification along the left lateral chest wall, could represent pulmonary mass versus loculated effusion.      DX-CHEST-LIMITED (1 VIEW)   Final Result         1.  Pulmonary edema and/or infiltrates.   2.  Stable opacification along the left lateral chest wall, could represent pulmonary mass versus loculated effusion.      DX-CHEST-LIMITED (1 VIEW)   Final Result      1.  Stable predominantly right-sided airspace disease consistent with Covid 19 pneumonia.   2.  Stable left apical pneumothorax.      IR-MIDLINE CATHETER INSERTION WO GUIDANCE > AGE 3   Final Result                  Ultrasound-guided midline placement performed by qualified nursing staff    as above.          DX-CHEST-LIMITED (1 VIEW)   Final Result         No significant interval change.            DX-CHEST-PORTABLE (1 VIEW)   Final Result      1.  Unchanged bilateral pulmonary opacities.   2.  No pneumothorax is seen.      DX-CHEST-LIMITED (1  VIEW)   Final Result      1.  Unchanged BILATERAL pulmonary opacities which may reflect a combination of airspace disease and blood or other fluid within pneumatoceles. Necrotizing pneumonia is also a consideration.   2.  Small LEFT pneumothorax, unchanged      DX-CHEST-LIMITED (1 VIEW)   Final Result         Slightly improved airspace opacity in the right lung.         DX-CHEST-LIMITED (1 VIEW)   Final Result         No significant interval change. Left chest tube is in the left basilar hemithorax.      CT-CHEST (THORAX) WITH   Final Result      1.  Trace right and small left pneumothorax.   2.  There is a large left loculated high density fluid collection, may be consolidated blood in a pneumatocele.   3.  There is a left chest tube with the tip in the pericardial fat. There is associated subcutaneous emphysema.   4.  There is a loculated high density right collection located posteriorly, similar in appearance to the collection on the left. There is a right pleural space drainage catheter with tip in the superior anterior pleura.   5.  Extensive mixed groundglass and consolidative bilateral pulmonary opacities.      Findings communicated with Dr. Fields by Dr. Verde at 4:41 PM 9/29/2021               DX-CHEST-LIMITED (1 VIEW)   Final Result      No significant interval change.      OUTSIDE IMAGES-US VASCULAR   Final Result      OUTSIDE IMAGES-DX CHEST   Final Result      CT-FOREIGN FILM CAT SCAN   Final Result      OUTSIDE IMAGES-DX CHEST   Final Result      OUTSIDE IMAGES-DX CHEST   Final Result      OUTSIDE IMAGES-DX CHEST   Final Result      OUTSIDE IMAGES-DX CHEST   Final Result      OUTSIDE IMAGES-DX CHEST   Final Result      OUTSIDE IMAGES-DX CHEST   Final Result      CT-FOREIGN FILM CAT SCAN   Final Result      DX-CHEST-LIMITED (1 VIEW)   Final Result         No significant interval change. Small left apical pneumothorax.      DX-CHEST-PORTABLE (1 VIEW)   Final Result         Left apical pneumothorax is  seen. There is a bulging elliptical opacity in the left hemithorax which could relate to loculated fluid or hematoma.      CRITICAL RESULT READ BACK: Preliminary findings discussed with and critical read back performed by Dr. RADHA TAPIA JR via telephone on 9/28/2021 10:58 PM           Assessment/Plan  * Acute respiratory failure with hypoxia (HCC)- (present on admission)  Assessment & Plan  -Requiring 5L O2 at rest via oxymask   -CXR showing decreased edema with stable left opacification   -Trial 40mg lasix as he does appear hypervolemic on exam   -Continue on empiric cefepime.   -Procal declining - if neg 10/7 can stop abx  -BNP wnl  -RT protocol  -Aggressive pulm toileting   -PT/OT to consult   -Continue to wean o2 as tolerated.     Sinus tachycardia- (present on admission)  Assessment & Plan  -Likely secondary to above   -Pain control   -hemodynamically stable   -Monitor     DVT (deep venous thrombosis) (HCC)- (present on admission)  Assessment & Plan  -Continue to hold xarelto in case of surgery.  Initiate lovenox 40 mg BID  -AC likely can be stopped at DC as this likely contributed to hemothorax      Anemia associated with acute blood loss- (present on admission)  Assessment & Plan  -H/H stable  -Monitor hemoglobin and transfuse if less than 7      Type 2 diabetes mellitus without complication, without long-term current use of insulin (HCC)- (present on admission)  Assessment & Plan  Insulin sliding scale monitor CBGs.  Currently stable.          Bilateral pneumothoraces- (present on admission)  Assessment & Plan  -Pulmonology, Dr. Linn consulted. Signed off   -LEFT removed on 10/1  -RIGHT removed on 10/2  -No recommendations for IR drain at this time per pulmonology and thoracic surgery  -Continue aggressive pulmonary toileting and ambulation as tolerated   -Continue cefepime       Pneumonia due to COVID-19 virus- (present on admission)  Assessment & Plan  -Continue close clinical monitoring  -Previously  treated with Decadron remdesivir and barcitinib  -Treating empirically with cefepime       VTE prophylaxis: enoxaparin ppx    I have performed a physical exam and reviewed and updated ROS and Plan today (10/7/2021). In review of yesterday's note (10/6/2021), there are no changes except as documented above.

## 2021-10-07 NOTE — PROGRESS NOTES
Pt A&O x 4.  Reporting 3/10 pain. Declines interventions at this time.  Pt on 3L O2 sating 92-92%.  in use.   Right midline in place running TKO.  Old bilateral chest tube site dressings CDI.  LBM 10/6/21. Tolerating diet.  POC discussed with pt. All needs addressed at this time.

## 2021-10-07 NOTE — CARE PLAN
Shift Goals  Clinical Goals: ambulated  Patient Goals: ambulate  Family Goals: NA    Progress made toward(s) clinical / shift goals:      Patient is educated on care plan and is knowledgeable about entirety course of stay. Patient calls appropriately when in pain.     Problem: Knowledge Deficit - Standard  Goal: Patient and family/care givers will demonstrate understanding of plan of care, disease process/condition, diagnostic tests and medications  Outcome: Progressing     Problem: Pain - Standard  Goal: Alleviation of pain or a reduction in pain to the patient’s comfort goal  Outcome: Progressing

## 2021-10-07 NOTE — PROGRESS NOTES
[facility] Medicine Daily Progress Note    Date of Service  10/7/2021    Chief Complaint  Heriberto Boggs is a 31 y.o. male admitted 9/28/2021 with respiratory distress    Hospital Course  Mr. Boggs is a 31-year-old male with a PMHx of DM T2, asthma, who developed COVID-19 viral pneumonia approximately 5 weeks ago and has had multiple admissions since then.  Patient was transferred over from [facility] to [facility] for advance care.  Patient was initially in the emergency department and had multiple hospitalization over the past month for complications secondary to COVID-19 pneumonia.    Admission on 9/22-9/25 diagnosed him with lower extremity DVT and was discharged on Xarelto and oxygen supplementation.    On 9/26, patient developed sudden onset of bilateral severe chest pain with worsening dyspnea and severe hypoxia.  Patient presented back to the emergency department where he was noted to have bilateral pneumothoraces and underwent chest tube placement and was started on remdesivir, vancomycin, cefdinir, and Decadron.  Patient also started to have hemoptysis and CT of the chest revealed bilateral large pneumatoceles with blood attenuation on each side as well as residual pneumothoraces left greater than right which she was subsequently transferred to our facility for thoracic surgery evaluation.    Patient initially admitted to the intensive care unit where broad-spectrum antibiotics was continued.  Anticoagulation was also discontinued secondary to his hemoptysis and CT findings.  Thoracic surgeon Dr. Montes was consulted which ordered a repeat CT on 9/29.  Patient still did complain of chest wall pain, and pain over chest tube sites and has been having productive cough with hemoptysis.  No fever, or chills.  Patient placed on 4L NC O2.  Patient also having intermittent nausea and poor appetite.  No melena, or rectal bleeding noted.  Patient is complaining of malaise and fatigue.  Bilateral chest tube  is on water seal as of 9/30.  Patient transferred into the surgical floor for further evaluation and monitoring.      Interval Problem Update  10/5:  Respiratory status stable on 6 L.  Continue to wean as tolerated.  Mild increase in pulmonary edema on X-ray.  BNP wnl.  Continue aggressive RT protocol.  Denies acute pain.    10/6:  CXR improved today.  Weaning o2, now stable on 5L.  Reports his shortness of breath is overall much improved.  Hgb stable.  Increase lovenox to BID in setting of know DVT.    10/7: CXR reviewed by me. L middle lobe effusion stable. On 3L which is is baseline at home. No plans for surgical intervention in loculated effusion. Needs Cefepime until 1800 10/8, then likely clear for DC. Labs reviewed and stable.    I have personally seen and examined the patient at bedside. I discussed the plan of care with patient and bedside RN.    Consultants/Specialty  pulmonary and thoracic surgery    Code Status  Full Code    Disposition  Patient is not medically cleared.   Anticipate discharge to TBD.  I have placed the appropriate orders for post-discharge needs.    Review of Systems  Review of Systems   Constitutional: Negative for chills and fever.   Respiratory: Positive for cough, hemoptysis and sputum production. Negative for shortness of breath and wheezing.    Cardiovascular: Positive for chest pain.   Gastrointestinal: Negative for nausea and vomiting.   Genitourinary: Negative for dysuria.   Musculoskeletal: Negative for myalgias.   Neurological: Negative for loss of consciousness.   All other systems reviewed and are negative.       Physical Exam  Temp:  [37.1 °C (98.8 °F)] 37.1 °C (98.8 °F)  Pulse:  [100] 100  Resp:  [18] 18  BP: (119)/(74) 119/74  SpO2:  [93 %] 93 %    Physical Exam  Vitals and nursing note reviewed.   Constitutional:       Interventions: Face mask in place.   HENT:      Head: Normocephalic and atraumatic.      Nose: Nose normal.   Eyes:      Conjunctiva/sclera: Conjunctivae  normal.      Pupils: Pupils are equal, round, and reactive to light.   Cardiovascular:      Rate and Rhythm: Normal rate and regular rhythm.      Heart sounds: No murmur heard.   No friction rub.   Pulmonary:      Effort: No tachypnea or respiratory distress.      Breath sounds: No stridor. Examination of the left-middle field reveals decreased breath sounds. Decreased breath sounds present.   Abdominal:      General: Bowel sounds are normal. There is no distension.      Palpations: Abdomen is soft.   Musculoskeletal:      Cervical back: Neck supple.   Skin:     General: Skin is warm and dry.   Neurological:      Mental Status: He is alert.         Fluids    Intake/Output Summary (Last 24 hours) at 10/7/2021 1439  Last data filed at 10/7/2021 0600  Gross per 24 hour   Intake 900 ml   Output no documentation   Net 900 ml       Laboratory  Recent Labs     10/05/21  0252 10/06/21  0425 10/07/21  0346   WBC 6.8 6.3 6.4   RBC 2.75* 2.93* 3.02*   HEMOGLOBIN 8.0* 8.4* 8.7*   HEMATOCRIT 24.6* 26.8* 27.3*   MCV 89.5 91.5 90.4   MCH 29.1 28.7 28.8   MCHC 32.5* 31.3* 31.9*   RDW 45.6 47.1 47.3   PLATELETCT 384 380 356   MPV 8.7* 8.8* 8.6*     Recent Labs     10/05/21  0252 10/06/21  0425 10/07/21  0346   SODIUM 138 141 139   POTASSIUM 3.9 3.9 4.0   CHLORIDE 101 104 103   CO2 27 26 27   GLUCOSE 122* 123* 133*   BUN 9 10 11   CREATININE 0.65 0.71 0.60   CALCIUM 8.8 8.5 9.0                   Imaging  DX-CHEST-LIMITED (1 VIEW)   Final Result         1.  Pulmonary edema and/or infiltrates, stable since prior study.   2.  Stable opacification along the left lateral chest wall, could represent pulmonary mass versus loculated effusion.      DX-CHEST-LIMITED (1 VIEW)   Final Result         1.  Pulmonary edema and/or infiltrates, decreased since prior study.   2.  Stable opacification along the left lateral chest wall, could represent pulmonary mass versus loculated effusion.      DX-CHEST-LIMITED (1 VIEW)   Final Result         1.   Pulmonary edema and/or infiltrates, somewhat increased since prior study.   2.  Stable opacification along the left lateral chest wall, could represent pulmonary mass versus loculated effusion.      DX-CHEST-LIMITED (1 VIEW)   Final Result         1.  Pulmonary edema and/or infiltrates.   2.  Stable opacification along the left lateral chest wall, could represent pulmonary mass versus loculated effusion.      DX-CHEST-LIMITED (1 VIEW)   Final Result      1.  Stable predominantly right-sided airspace disease consistent with Covid 19 pneumonia.   2.  Stable left apical pneumothorax.      IR-MIDLINE CATHETER INSERTION WO GUIDANCE > AGE 3   Final Result                  Ultrasound-guided midline placement performed by qualified nursing staff    as above.          DX-CHEST-LIMITED (1 VIEW)   Final Result         No significant interval change.            DX-CHEST-PORTABLE (1 VIEW)   Final Result      1.  Unchanged bilateral pulmonary opacities.   2.  No pneumothorax is seen.      DX-CHEST-LIMITED (1 VIEW)   Final Result      1.  Unchanged BILATERAL pulmonary opacities which may reflect a combination of airspace disease and blood or other fluid within pneumatoceles. Necrotizing pneumonia is also a consideration.   2.  Small LEFT pneumothorax, unchanged      DX-CHEST-LIMITED (1 VIEW)   Final Result         Slightly improved airspace opacity in the right lung.         DX-CHEST-LIMITED (1 VIEW)   Final Result         No significant interval change. Left chest tube is in the left basilar hemithorax.      CT-CHEST (THORAX) WITH   Final Result      1.  Trace right and small left pneumothorax.   2.  There is a large left loculated high density fluid collection, may be consolidated blood in a pneumatocele.   3.  There is a left chest tube with the tip in the pericardial fat. There is associated subcutaneous emphysema.   4.  There is a loculated high density right collection located posteriorly, similar in appearance to the  collection on the left. There is a right pleural space drainage catheter with tip in the superior anterior pleura.   5.  Extensive mixed groundglass and consolidative bilateral pulmonary opacities.      Findings communicated with Dr. Fields by Dr. Verde at 4:41 PM 9/29/2021               DX-CHEST-LIMITED (1 VIEW)   Final Result      No significant interval change.      OUTSIDE IMAGES-US VASCULAR   Final Result      OUTSIDE IMAGES-DX CHEST   Final Result      CT-FOREIGN FILM CAT SCAN   Final Result      OUTSIDE IMAGES-DX CHEST   Final Result      OUTSIDE IMAGES-DX CHEST   Final Result      OUTSIDE IMAGES-DX CHEST   Final Result      OUTSIDE IMAGES-DX CHEST   Final Result      OUTSIDE IMAGES-DX CHEST   Final Result      OUTSIDE IMAGES-DX CHEST   Final Result      CT-FOREIGN FILM CAT SCAN   Final Result      DX-CHEST-LIMITED (1 VIEW)   Final Result         No significant interval change. Small left apical pneumothorax.      DX-CHEST-PORTABLE (1 VIEW)   Final Result         Left apical pneumothorax is seen. There is a bulging elliptical opacity in the left hemithorax which could relate to loculated fluid or hematoma.      CRITICAL RESULT READ BACK: Preliminary findings discussed with and critical read back performed by Dr. RADHA TAPIA JR via telephone on 9/28/2021 10:58 PM           Assessment/Plan  * Acute respiratory failure with hypoxia (HCC)- (present on admission)  Assessment & Plan  -Requiring 5L O2 at rest via oxymask   -CXR showing decreased edema with stable left opacification   -Trial 40mg lasix as he does appear hypervolemic on exam   -Continue on empiric cefepime.   -Procal negative  -BNP wnl  -RT protocol  -Aggressive pulm toileting   -PT/OT to consult   -Continue to wean o2 as tolerated.     Sinus tachycardia- (present on admission)  Assessment & Plan  -Likely secondary to above   -Pain control   -hemodynamically stable   -Monitor     DVT (deep venous thrombosis) (HCC)- (present on admission)  Assessment  & Plan  -Continue to hold xarelto in case of surgery.  Initiate lovenox 40 mg BID.  Resume xarelto at discharge .      Anemia associated with acute blood loss- (present on admission)  Assessment & Plan  -H/H stable  -Monitor hemoglobin and transfuse if less than 7      Type 2 diabetes mellitus without complication, without long-term current use of insulin (HCC)- (present on admission)  Assessment & Plan  Insulin sliding scale monitor CBGs.  Currently stable.          Bilateral pneumothoraces- (present on admission)  Assessment & Plan  -Pulmonology, Dr. Linn consulted. Signed off   -LEFT removed on 10/1  -RIGHT removed on 10/2  -No recommendations for IR drain at this time per pulmonology and thoracic surgery  -Continue aggressive pulmonary toileting and ambulation as tolerated   -Continue cefepime       Pneumonia due to COVID-19 virus- (present on admission)  Assessment & Plan  -Continue close clinical monitoring  -Previously treated with Decadron remdesivir and barcitinib  -Treating empirically with cefepime       VTE prophylaxis: enoxaparin ppx    I have performed a physical exam and reviewed and updated ROS and Plan today (10/7/2021). In review of yesterday's note (10/6/2021), there are no changes except as documented above.

## 2021-10-08 ENCOUNTER — APPOINTMENT (OUTPATIENT)
Dept: RADIOLOGY | Facility: MEDICAL CENTER | Age: 31
DRG: 199 | End: 2021-10-08
Attending: NURSE PRACTITIONER
Payer: COMMERCIAL

## 2021-10-08 ENCOUNTER — HOSPITAL ENCOUNTER (INPATIENT)
Dept: RADIOLOGY | Facility: MEDICAL CENTER | Age: 31
DRG: 199 | End: 2021-10-08
Attending: INTERNAL MEDICINE | Admitting: HOSPITALIST
Payer: COMMERCIAL

## 2021-10-08 ENCOUNTER — PHARMACY VISIT (OUTPATIENT)
Dept: PHARMACY | Facility: MEDICAL CENTER | Age: 31
End: 2021-10-08
Payer: COMMERCIAL

## 2021-10-08 ENCOUNTER — APPOINTMENT (OUTPATIENT)
Dept: RADIOLOGY | Facility: MEDICAL CENTER | Age: 31
DRG: 199 | End: 2021-10-08
Attending: INTERNAL MEDICINE
Payer: COMMERCIAL

## 2021-10-08 VITALS
OXYGEN SATURATION: 98 % | DIASTOLIC BLOOD PRESSURE: 79 MMHG | TEMPERATURE: 98.2 F | BODY MASS INDEX: 35.53 KG/M2 | SYSTOLIC BLOOD PRESSURE: 116 MMHG | RESPIRATION RATE: 18 BRPM | HEART RATE: 100 BPM | WEIGHT: 285.72 LBS | HEIGHT: 75 IN

## 2021-10-08 PROBLEM — U07.1 PNEUMONIA DUE TO COVID-19 VIRUS: Status: RESOLVED | Noted: 2021-09-28 | Resolved: 2021-10-08

## 2021-10-08 PROBLEM — J96.21 ACUTE ON CHRONIC RESPIRATORY FAILURE WITH HYPOXIA (HCC): Status: ACTIVE | Noted: 2021-09-28

## 2021-10-08 PROBLEM — J12.82 PNEUMONIA DUE TO COVID-19 VIRUS: Status: RESOLVED | Noted: 2021-09-28 | Resolved: 2021-10-08

## 2021-10-08 PROBLEM — J93.9 BILATERAL PNEUMOTHORACES: Status: RESOLVED | Noted: 2021-09-28 | Resolved: 2021-10-08

## 2021-10-08 LAB
GLUCOSE BLD-MCNC: 111 MG/DL (ref 65–99)
GLUCOSE BLD-MCNC: 117 MG/DL (ref 65–99)
GLUCOSE BLD-MCNC: 118 MG/DL (ref 65–99)
PROCALCITONIN SERPL-MCNC: 0.08 NG/ML

## 2021-10-08 PROCEDURE — 93970 EXTREMITY STUDY: CPT

## 2021-10-08 PROCEDURE — 84145 PROCALCITONIN (PCT): CPT

## 2021-10-08 PROCEDURE — RXMED WILLOW AMBULATORY MEDICATION CHARGE: Performed by: NURSE PRACTITIONER

## 2021-10-08 PROCEDURE — A9270 NON-COVERED ITEM OR SERVICE: HCPCS | Performed by: INTERNAL MEDICINE

## 2021-10-08 PROCEDURE — 82962 GLUCOSE BLOOD TEST: CPT

## 2021-10-08 PROCEDURE — 700102 HCHG RX REV CODE 250 W/ 637 OVERRIDE(OP): Performed by: INTERNAL MEDICINE

## 2021-10-08 PROCEDURE — 93970 EXTREMITY STUDY: CPT | Mod: 26 | Performed by: INTERNAL MEDICINE

## 2021-10-08 PROCEDURE — 71045 X-RAY EXAM CHEST 1 VIEW: CPT

## 2021-10-08 PROCEDURE — 99239 HOSP IP/OBS DSCHRG MGMT >30: CPT | Performed by: NURSE PRACTITIONER

## 2021-10-08 PROCEDURE — 36415 COLL VENOUS BLD VENIPUNCTURE: CPT

## 2021-10-08 PROCEDURE — 97116 GAIT TRAINING THERAPY: CPT

## 2021-10-08 PROCEDURE — 700111 HCHG RX REV CODE 636 W/ 250 OVERRIDE (IP): Performed by: NURSE PRACTITIONER

## 2021-10-08 RX ORDER — GUAIFENESIN AND DEXTROMETHORPHAN HYDROBROMIDE 1200; 60 MG/1; MG/1
TABLET, EXTENDED RELEASE ORAL
Qty: 28 TABLET | Refills: 0 | COMMUNITY
Start: 2021-10-08 | End: 2021-10-08

## 2021-10-08 RX ORDER — GUAIFENESIN AND DEXTROMETHORPHAN HYDROBROMIDE 600; 30 MG/1; MG/1
TABLET, EXTENDED RELEASE ORAL
Qty: 28 TABLET | Refills: 0 | Status: SHIPPED | OUTPATIENT
Start: 2021-10-08

## 2021-10-08 RX ADMIN — ENOXAPARIN SODIUM 40 MG: 40 INJECTION SUBCUTANEOUS at 17:15

## 2021-10-08 RX ADMIN — ENOXAPARIN SODIUM 40 MG: 40 INJECTION SUBCUTANEOUS at 04:44

## 2021-10-08 ASSESSMENT — COGNITIVE AND FUNCTIONAL STATUS - GENERAL
SUGGESTED CMS G CODE MODIFIER MOBILITY: CI
CLIMB 3 TO 5 STEPS WITH RAILING: A LITTLE
MOBILITY SCORE: 23

## 2021-10-08 ASSESSMENT — GAIT ASSESSMENTS
GAIT LEVEL OF ASSIST: SUPERVISED
DISTANCE (FEET): 120
DEVIATION: NO DEVIATION

## 2021-10-08 ASSESSMENT — PAIN DESCRIPTION - PAIN TYPE
TYPE: ACUTE PAIN

## 2021-10-08 NOTE — CARE PLAN
Problem: Knowledge Deficit - Standard  Goal: Patient and family/care givers will demonstrate understanding of plan of care, disease process/condition, diagnostic tests and medications  Outcome: Met     Problem: Pain - Standard  Goal: Alleviation of pain or a reduction in pain to the patient’s comfort goal  Outcome: Met     Problem: Skin Integrity  Goal: Skin integrity is maintained or improved  Outcome: Met     Problem: Fall Risk  Goal: Patient will remain free from falls  Outcome: Met     Problem: Psychosocial  Goal: Patient's level of anxiety will decrease  Outcome: Met     Problem: Communication  Goal: The ability to communicate needs accurately and effectively will improve  Outcome: Met     Problem: Respiratory  Goal: Patient will achieve/maintain optimum respiratory ventilation and gas exchange  Outcome: Met     Problem: Infection - Standard  Goal: Patient will remain free from infection  Outcome: Met     Problem: Wound/ / Incision Healing  Goal: Patient's wound/surgical incision will decrease in size and heals properly  Outcome: Met   The patient is Stable - Low risk of patient condition declining or worsening    Shift Goals  Clinical Goals: discharge with home O2  Patient Goals: rest  Family Goals: NA    Progress made toward(s) clinical / shift goals:  Patient discharging today with home O2 provided by family.         Patient is not progressing towards the following goals:

## 2021-10-08 NOTE — DISCHARGE INSTRUCTIONS
Discharge Instructions per KEIRA Pedroza    1.  Review your discharge Medication Reconciliation form. You may be provided with new prescriptions or changes to previously prescribed medications.  Be sure to take all of your prescribed medications exactly as directed.  Further questions can be directed to your local pharmacist or primary care provider (PCP).    2. Follow-up with your PCP.  An appointment may have already been scheduled for you.  Please review your discharge paperwork and locate this information.  Please be sure to call your PCP to cancel if you are unable to make this appointment or require schedule changes.  It is critical to to follow-up with your PCP to review hospital records and ensure any further diagnostic work-up, prescription refills, or referrals.     3. ACTIVITIES: After discharge from the hospital, you may resume routine activities including walking and going u/down stairs. However, no strenuous activity. For further clarification, call your PCP     4. DRIVING: You may drive whenever you are off pain medications and are able to perform the activities needed to drive, i.e. turning, bending, twisting, etc.     5. BOWEL FUNCTION: A few patients, after hospitalizations, will develop bowel irregularity. You could also experience constipation due to pain medication and decreased activity level. If you feel this is occurring, please take an over-the-counter laxative (Milk of Magnesia, Ex-Lax, Senokot, etc.) until the problem has resolved.     6. PAIN MEDICATION: You may be given a prescription for pain medication at discharge. Please take these as directed. It is important to remember not to take medications on an empty stomach as this may cause nausea/vomiting.  You can transition from the prescription-strength pain medication to tylenol (and ONLY tyelenol) if you are medically cleared to do so. DO NOTE TAKE NSAIDS SUCH AS IBUPROFEN OR NAPROXEN (MOTRIN OR ALEVE) WHILE YOUR ARE TAKING BLOOD  THINNERS.    7. LABS/IMAGING: You may be asked to complete labs or imaging prior to your next provider appointment. If you use [facility], a paper order is not required. If you use another lab or imaging center, be sure you have your order requisition form at discharge. Contact scheduling or use [facility] to arrange a lab appointment.    8. BLOOD PRESSURE: Measure your blood pressure and pulse every day and write it down on a piece of paper or record it in a smart phone lamont.  Bring this to your next PCP appointment.  If you feel dizzy, take your blood pressure and pulse and call your PCP.    9. RETURN TO THE ER: Return to the ER if you develop NEW OR WORSENING BLOODY SPUTUM, worsening chest pain, shortness of breath, bloody sputum, fever of 101.5 or greater, intractable nausea or vomiting, blood in the vomit or urine, intractable pain, new onset inability to ambulate, focal motor weakness, acute vision disturbance, acute speech disturbance, intractable abdominal pain, diffuse watery diarrhea or any other worrisome symptoms.          Discharge Instructions    Discharged to home by car with relative. Discharged via wheelchair, hospital escort: Yes.  Special equipment needed: Oxygen    Be sure to schedule a follow-up appointment with your primary care doctor or any specialists as instructed.     Discharge Plan:   Diet Plan: Discussed  Activity Level: Discussed  Confirmed Follow up Appointment: No (Comments)  Confirmed Symptoms Management: Discussed  Medication Reconciliation Updated: Yes  Influenza Vaccine Indication: Indicated: 9 to 64 years of age  Influenza Vaccine Given - only chart on this line when given: Influenza Vaccine Given (See MAR)    I understand that a diet low in cholesterol, fat, and sodium is recommended for good health. Unless I have been given specific instructions below for another diet, I accept this instruction as my diet prescription.   Other diet: Diabetic    Special Instructions: None    · Is  patient discharged on Warfarin / Coumadin?   No   Deep Vein Thrombosis Discharge Instructions    A deep vein thrombosis (DVT) is a blood clot (thrombus) that develops in a deep vein. A DVT is a clot in the deep, larger veins of the leg, arm, or pelvis. These are more dangerous than clots that might form in veins on the surface of the body. Deep vein thrombosis can lead to complications if the clot breaks off and travels in the bloodstream to the lungs.     CAUSES  Blood clots form in a vein for different reasons. Usually several things cause blood clots. They include:   · The flow of blood slows down.   · The inside of the vein is damaged in some way.   · The person has a condition that makes blood clot more easily. These conditions may include:  · Older age (especially over 75 years old).  · Having a history of blood clots.  · Having major or lengthy surgery. Hip surgery is particularly high-risk.   · Breaking a hip or leg.  · Sitting or lying still for a long time.  · Cancer or cancer treatment.  · Having a long, thin tube (catheter) placed inside a vein during a medical procedure.   · Being overweight (obese).  · Pregnancy and childbirth.  · Medicines with estrogen.  · Smoking.  · Other circulation or heart problems.     SYMPTOMS  When a clot forms, it can either partially or totally block the blood flow in that vein. Symptoms of a DVT can include:  · Swelling of the leg or arm, especially if one side is much worse.  · Warmth and redness of the leg or arm, especially if one side is much worse.   · Pain in an arm or leg. If the clot is in the leg, symptoms may be more noticeable or worse when standing or walking.  If the blood clot travels to the lung, it may cause:  · Shortness of breath.  · Chest pain. The pain may be worsened by deep breaths.   · Coughing up thick mucus (phlegm), possibly flecked with blood.   Anyone with these symptoms should get emergency medical treatment right away. Call your local  emergency  Services (911 in [place] if you have these symptoms.     DIAGNOSIS  If a DVT is suspected, your caregiver will take a full medical history. He or she will also perform a physical exam. Tests that also may be required include:   · Studies of the clotting properties of the blood.   · An ultrasound scan.   · X-rays to show the flow of blood when special dye is injected into the veins (venography).   · Studies of your lungs if you have any chest symptoms.     PREVENTION  · Exercise the legs regularly. Take a brisk 30 minute walk every day.   · Maintain a weight that is appropriate for your height.  · Avoid sitting or lying in bed for long periods of time without moving your legs.   · Women, particularly those over the age of 35, should consider the risks and benefits of taking estrogen medicine, including birth control pills.   · Do not smoke, especially if you take estrogen medicines.   · Long-distance travel can increase your risk. You should exercise your legs by walking or pumping the muscles every hour.   · In hospital prevention: Prevention may include medical and non medical measures.     TREATMENT  · The most common treatment for DVT is blood thinning (anticoagulant) medicine, which reduces the blood's tendency to clot. Anticoagulants can stop new blood clots from forming and old ones from growing. They cannot dissolve existing clots. Your body does this by itself over time. Anticoagulants can be given by mouth, by intravenous (IV) access, or by injection. Your caregiver will determine the best program for you.   · Less commonly, clot-dissolving drugs (thrombolytics) are used to dissolve a DVT. They carry a high risk of bleeding, so they are used mainly in severe cases.   · Very rarely, a blood clot in the leg needs to be removed surgically.   · If you are unable to take anticoagulants, your caregiver may arrange for you to have a filter placed in a main vein in your belly (abdomen). This filter  prevents clots from traveling to your lungs.     HOME CARE INSTRUCTIONS  Take all medicines prescribed by your caregiver. Follow the directions carefully.   · You will most likely continue taking anticoagulants after you leave the hospital. Your caregiver will advise you on the length of treatment (usually 3 to 5 months, sometimes for life).   · Taking too much or too little of an anticoagulant is dangerous. While taking this type of medicine, you will need to have regular blood tests to be sure the dose is correct. The dose can change for many reasons. It is critically important that you take this medicine exactly as prescribed, and that you have blood tests exactly as directed.   · Many foods can interfere with anticoagulants. These include foods high in vitamin K, such as spinach, kale, broccoli, cabbage, giles and turnip greens, [place] sprouts, peas, cauliflower, seaweed, parsley, beef and pork liver, green tea, and soybean oil. Your caregiver should discuss limits on these foods with you or you should arrange a visit with a dietician to answer your questions.   · Many medicines can interfere with anticoagulants. You must tell your caregiver about any and all medicines you take. This includes all vitamins and supplements. Be especially cautious with aspirin and anti-inflammatory medicines. Ask your caregiver before taking these.   · Anticoagulants can have side effects, mostly excessive bruising or bleeding. You will need to hold pressure over cuts for longer than usual. Avoid alcoholic drinks or consume only very small amounts while taking this medicine.    If you are taking an anticoagulant:  · Wear a medical alert bracelet.  · Notify your dentist or other caregivers before procedures.  · Avoid contact sports.    · Ask your caregiver how soon you can go back to normal activities. Not being active can lead to new clots. Ask for a list of what you should and should not do.   · Exercise your lower leg  muscles. This is important while traveling.   · You may need to wear compression stockings. These are tight elastic stockings that apply pressure to the lower legs. This can help keep the blood in the legs from clotting.   · If you are a smoker, you should quit.   · Learn as much as you can about DVT.     SEEK MEDICAL CARE IF:  · You have unusual bruising or any bleeding problems.  · The swelling or pain in your affected arm or leg is not gradually improving.   · You anticipate surgery or long-distance travel. You should get specific advice on DVT prevention.   · You discover other family members with blood clots. This may require further testing for inherited diseases or conditions.     SEEK IMMEDIATE MEDICAL CARE IF:  · You develop chest pain.  · You develop severe shortness of breath.  · You begin to cough up bloody mucus or phlegm (sputum).  · You feel dizzy or faint.   · You develop swelling or pain in the leg.  · You have breathing problems after traveling.    MAKE SURE YOU:  · Understand these instructions.  · Will watch your condition.  Will get help right away if you are not doing well or get worse.     Depression / Suicide Risk    As you are discharged from this [facility] facility, it is important to learn how to keep safe from harming yourself.    Recognize the warning signs:  · Abrupt changes in personality, positive or negative- including increase in energy   · Giving away possessions  · Change in eating patterns- significant weight changes-  positive or negative  · Change in sleeping patterns- unable to sleep or sleeping all the time   · Unwillingness or inability to communicate  · Depression  · Unusual sadness, discouragement and loneliness  · Talk of wanting to die  · Neglect of personal appearance   · Rebelliousness- reckless behavior  · Withdrawal from people/activities they love  · Confusion- inability to concentrate     If you or a loved one observes any of these behaviors or has concerns  about self-harm, here's what you can do:  · Talk about it- your feelings and reasons for harming yourself  · Remove any means that you might use to hurt yourself (examples: pills, rope, extension cords, firearm)  · Get professional help from the community (Mental Health, Substance Abuse, psychological counseling)  · Do not be alone:Call your Safe Contact- someone whom you trust who will be there for you.  · Call your local CRISIS HOTLINE 567-7408 or 686-851-6241  · Call your local Children's Mobile Crisis Response Team [place] (520) 968-2762 or www.[employer] [place]  · Call the toll free National Suicide Prevention Hotlines   · National Suicide Prevention Lifeline 490-255-DYGG (2111)  · National Strut Line Network 800-SUICIDE (453-2770)      Home Oxygen Use, Adult  When a medical condition keeps you from getting enough oxygen, your health care provider may instruct you to take extra oxygen at home. Your health care provider will let you know:  · When to take oxygen.  · For how long to take oxygen.  · How quickly oxygen should be delivered (flow rate), in liters per minute (LPM or L/M).  Home oxygen can be given through:  · A mask.  · A nasal cannula. This is a device or tube that goes in the nostrils.  · A transtracheal catheter. This is a small, flexible tube placed in the trachea.  · A tracheostomy. This is a surgically made opening in the trachea.  These devices are connected with tubing to an oxygen source, such as:  · A tank. Tanks hold oxygen in gas form. They must be replaced when the oxygen is used up.  · A liquid oxygen device. This holds oxygen in liquid form. It must be replaced when the oxygen is used up.  · An oxygen concentrator machine. This filters oxygen in the room. It uses electricity, so you must have a backup cylinder of oxygen in case the power goes out.  Supplies needed:  To use oxygen, you will need:  · A mask, nasal cannula, transtracheal catheter, or tracheostomy.  · An oxygen tank, a  liquid oxygen device, or an oxygen concentrator.  · The tape that your health care provider recommends (optional).  If you use a transtracheal catheter and your prescribed flow rate is 1 LPM or greater, you will also need a humidifier.  Risks and complications  · Fire. This can happen if the oxygen is exposed to a heat source, flame, or spark.  · Injury to skin. This can happen if liquid oxygen touches your skin.  · Organ damage. This can happen if you get too little oxygen.  How to use oxygen  Your health care provider or a representative from your medical device company will show you how to use your oxygen device. Follow her or his instructions. The instructions may look something like this:  1. Wash your hands.  2. If you use an oxygen concentrator, make sure it is plugged in.  3. Place one end of the tube into the port on the tank, device, or machine.  4. Place the mask over your nose and mouth. Or, place the nasal cannula and secure it with tape if instructed. If you use a tracheostomy or transtracheal catheter, connect it to the oxygen source as directed.  5. Make sure the liter-flow setting on the machine is at the level prescribed by your health care provider.  6. Turn on the machine or adjust the knob on the tank or device to the correct liter-flow setting.  7. When you are done, turn off and unplug the machine, or turn the knob to OFF.  How to clean and care for the oxygen supplies  Nasal cannula  · Clean it with a warm, wet cloth daily or as needed.  · Wash it with a liquid soap once a week.  · Rinse it thoroughly once or twice a week.  · Replace it every 2-4 weeks.  · If you have an infection, such as a cold or pneumonia, change the cannula when you get better.  Mask  · Replace it every 2-4 weeks.  · If you have an infection, such as a cold or pneumonia, change the mask when you get better.  Humidifier bottle  · Wash the bottle between each refill:  ? Wash it with soap and warm water.  ? Rinse it  "thoroughly.  ? Disinfect it and its top.  ? Air-dry it.  · Make sure it is dry before you refill it.  Oxygen concentrator  · Clean the air filter at least twice a week according to directions from your home medical equipment and service company.  · Wipe down the cabinet every day. To do this:  ? Unplug the unit.  ? Wipe down the cabinet with a damp cloth.  ? Dry the cabinet.  Other equipment  · Change any extra tubing every 1-3 months.  · Follow instructions from your health care provider about taking care of any other equipment.  Safety tips  Fire safety tips    · Keep your oxygen and oxygen supplies at least 5 ft away from sources of heat, flames, and bain at all times.  · Do not allow smoking near your oxygen. Put up \"no smoking\" signs in your home. Avoid smoking areas when in public.  · Do not use materials that can burn (are flammable) while you use oxygen.  · When you go to a restaurant with portable oxygen, ask to be seated in the nonsmoking section.  · Keep a fire extinguisher close by. Let your fire department know that you have oxygen in your home.  · Test your home smoke detectors regularly.  Traveling  · Secure your oxygen tank in the vehicle so that it does not move around. Follow instructions from your medical device company about how to safely secure your tank.  · Make sure you have enough oxygen for the amount of time you will be away from home.  · If you are planning air travel, contact the airline to find out if they allow the use of an approved portable oxygen concentrator. You may also need documents from your health care provider and medical device company before you travel.  General safety tips  · If you use an oxygen cylinder, make sure it is in a stand or secured to an object that will not move (fixed object).  · If you use liquid oxygen, make sure its container is kept upright.  · If you use an oxygen concentrator:  ? Tell your electric company. Make sure you are given priority service in " the event that your power goes out.  ? Avoid using extension cords, if possible.  Follow these instructions at home:  · Use oxygen only as told by your health care provider.  · Do not use alcohol or other drugs that make you relax (sedating drugs) unless instructed. They can slow down your breathing rate and make it hard to get in enough oxygen.  · Know how and when to order a refill of oxygen.  · Always keep a spare tank of oxygen. Plan ahead for holidays when you may not be able to get a prescription filled.  · Use water-based lubricants on your lips or nostrils. Do not use oil-based products like petroleum jelly.  · To prevent skin irritation on your cheeks or behind your ears, tuck some gauze under the tubing.  Contact a health care provider if:  · You get headaches often.  · You have shortness of breath.  · You have a lasting cough.  · You have anxiety.  · You are sleepy all the time.  · You develop an illness that affects your breathing.  · You cannot exercise at your regular level.  · You are restless.  · You have difficult or irregular breathing, and it is getting worse.  · You have a fever.  · You have persistent redness under your nose.  Get help right away if:  · You are confused.  · You have blue lips or fingernails.  · You are struggling to breathe.  Summary  · Your health care provider or a representative from your medical device company will show you how to use your oxygen device. Follow her or his instructions.  · If you use an oxygen concentrator, make sure it is plugged in.  · Make sure the liter-flow setting on the machine is at the level prescribed by your health care provider.  · Keep your oxygen and oxygen supplies at least 5 ft away from sources of heat, flames, and bain at all times.  This information is not intended to replace advice given to you by your health care provider. Make sure you discuss any questions you have with your health care provider.  Document Released: 03/09/2005  Document Revised: 06/06/2019 Document Reviewed: 07/11/2017  [employer] Patient Education © 2020 [employer]    Incision and Drainage, Care After  This sheet gives you information about how to care for yourself after your procedure. Your health care provider may also give you more specific instructions. If you have problems or questions, contact your health care provider.  What can I expect after the procedure?  After the procedure, it is common to have:  · Pain or discomfort around the incision site.  · Blood, fluid, or pus (drainage) from the incision.  · Redness and firm skin around the incision site.  Follow these instructions at home:  Medicines  · Take over-the-counter and prescription medicines only as told by your health care provider.  · If you were prescribed an antibiotic medicine, use or take it as told by your health care provider. Do not stop using the antibiotic even if you start to feel better.  Wound care  Follow instructions from your health care provider about how to take care of your wound. Make sure you:  · Wash your hands with soap and water before and after you change your bandage (dressing). If soap and water are not available, use hand .  · Change your dressing and packing as told by your health care provider.  ? If your dressing is dry or stuck when you try to remove it, moisten or wet the dressing with saline or water so that it can be removed without harming your skin or tissues.  ? If your wound is packed, leave it in place until your health care provider tells you to remove it. To remove the packing, moisten or wet the packing with saline or water so that it can be removed without harming your skin or tissues.  · Leave stitches (sutures), skin glue, or adhesive strips in place. These skin closures may need to stay in place for 2 weeks or longer. If adhesive strip edges start to loosen and curl up, you may trim the loose edges. Do not remove adhesive strips completely unless your  health care provider tells you to do that.  Check your wound every day for signs of infection. Check for:  · More redness, swelling, or pain.  · More fluid or blood.  · Warmth.  · Pus or a bad smell.  If you were sent home with a drain tube in place, follow instructions from your health care provider about:  · How to empty it.  · How to care for it at home.    General instructions  · Rest the affected area.  · Do not take baths, swim, or use a hot tub until your health care provider approves. Ask your health care provider if you may take showers. You may only be allowed to take sponge baths.  · Return to your normal activities as told by your health care provider. Ask your health care provider what activities are safe for you. Your health care provider may put you on activity or lifting restrictions.  · The incision will continue to drain. It is normal to have some clear or slightly bloody drainage. The amount of drainage should lessen each day.  · Do not apply any creams, ointments, or liquids unless you have been told to by your health care provider.  · Keep all follow-up visits as told by your health care provider. This is important.  Contact a health care provider if:  · Your cyst or abscess returns.  · You have a fever or chills.  · You have more redness, swelling, or pain around your incision.  · You have more fluid or blood coming from your incision.  · Your incision feels warm to the touch.  · You have pus or a bad smell coming from your incision.  · You have red streaks above or below the incision site.  Get help right away if:  · You have severe pain or bleeding.  · You cannot eat or drink without vomiting.  · You have decreased urine output.  · You become short of breath.  · You have chest pain.  · You cough up blood.  · The affected area becomes numb or starts to tingle.  These symptoms may represent a serious problem that is an emergency. Do not wait to see if the symptoms will go away. Get medical help  right away. Call your local emergency services (911 in the [place] Do not drive yourself to the hospital.  Summary  · After this procedure, it is common to have fluid, blood, or pus coming from the surgery site.  · Follow all home care instructions. You will be told how to take care of your incision, how to check for infection, and how to take medicines.  · If you were prescribed an antibiotic medicine, take it as told by your health care provider. Do not stop taking the antibiotic even if you start to feel better.  · Contact a health care provider if you have increased redness, swelling, or pain around your incision. Get help right away if you have chest pain, you vomit, you cough up blood, or you have shortness of breath.  · Keep all follow-up visits as told by your health care provider. This is important.  This information is not intended to replace advice given to you by your health care provider. Make sure you discuss any questions you have with your health care provider.  Document Released: 03/11/2013 Document Revised: 11/18/2019 Document Reviewed: 11/18/2019  Elsevier Patient Education © 2020 [employer]

## 2021-10-08 NOTE — PROGRESS NOTES
Pt is AO x4    Patient up for shower, standby assist calls appropriately.  Patient did have a decent nose bleed at 2115. Patient educated on the need to pinch the nares instead of allowing nares to drain freely.    Pt anticipates discharge tomorrow. Call light in place. Will continue to monitor

## 2021-10-08 NOTE — CARE PLAN
Shift Goals  Clinical Goals: Ambulate  Patient Goals: Ambulte; rest  Family Goals: NA    Progress made toward(s) clinical / shift goals:      Patient is aware of care plan and anticipates discharge tomorrow. Patient turns self, and participates in self care including showering himself     Problem: Knowledge Deficit - Standard  Goal: Patient and family/care givers will demonstrate understanding of plan of care, disease process/condition, diagnostic tests and medications  Outcome: Progressing     Problem: Skin Integrity  Goal: Skin integrity is maintained or improved  Outcome: Progressing

## 2021-10-08 NOTE — DISCHARGE SUMMARY
Discharge Summary    CHIEF COMPLAINT ON ADMISSION  No chief complaint on file.      Reason for Admission  pneumothorax, hemothorax, pneumome*     Admission Date  9/28/2021    CODE STATUS  Full Code    HPI & HOSPITAL COURSE  Mr. Boggs is a 31-year-old male with a PMHx of DM T2, asthma, who developed COVID-19 viral pneumonia approximately 5 weeks ago and has had multiple admissions since then.  Patient was transferred over from [facility] in [facility] to [facility] for surgical consultation.  Patient was initially in the emergency department in [facility] and had multiple hospitalization over the past month for complications secondary to COVID-19 pneumonia.    Admission on 9/22-9/25 diagnosed him with lower extremity DVT and was discharged on Xarelto and oxygen supplementation.    On 9/26, patient developed sudden onset of bilateral severe chest pain with worsening dyspnea and severe hypoxia.  Patient presented back to the emergency department where he was noted to have bilateral pneumothoraces and underwent chest tube placement and was started on remdesivir, vancomycin, cefdinir, and Decadron.  Patient also started to have hemoptysis and CT of the chest revealed bilateral large pneumatoceles with blood attenuation on each side as well as residual pneumothoraces left greater than right which she was subsequently transferred to our facility for thoracic surgery evaluation.    Patient initially admitted to the intensive care unit where broad-spectrum antibiotics were continued.  Anticoagulation was also discontinued secondary to his hemoptysis.  Thoracic surgeon Dr. Montes was consulted. CT chest was repeated on 9/29. Surgical intervention was not recommended. Fluid accumulation was throught to be loculated. Patient complained of chest wall pain, and pain over chest tube sites and has been having productive cough with hemoptysis.  No fever, or chills.  Patient placed on 4L NC O2.  Patient also having  intermittent nausea and poor appetite.  No melena, or rectal bleeding noted. Bilateral chest tube is on water seal as of 9/30.  Patient transferred into the surgical floor for further evaluation and monitoring.    There was no evidence of air leak and chest tube output diminished to none.  Discussed with Dr. Montes 10/1/2021 and the chest tubes were discontinued over the next 24 hours.  He was followed closely for complications but has demonstrated medical stability.  The case was further discussed with interventional radiology given his pneumatoceles in both thoracic surgery and interventional radiology recommended against intervention.  They recommended aggressive pulmonary toilet, and conservative therapy.    His cefepime is complete.  He has had no evidence of bleeding.  He does still complain intermittently of brown-tinged sputum.  He remains on 3 L which is his baseline oxygen requirement.  He will be cautiously restarted on Xarelto with instructions to stop immediately should he develop worsening hemoptysis or evidence of bleeding.  In that event he will need to return to the emergency room for consideration of an IVC filter.  He has been counseled extensively to avoid NSAIDs and aspirin.  He is encouraged to pursue rest with gentle ambulation and outpatient follow-up in [place], where he can receive a referral to pulmonology as he has [employer].    Therefore, he is discharged in fair and stable condition to home with close outpatient follow-up.    The patient met 2-midnight criteria for an inpatient stay at the time of discharge.    Discharge Date  10/8/2021    FOLLOW UP ITEMS POST DISCHARGE  PCP  Pulmonary follow up    DISCHARGE DIAGNOSES  Principal Problem:    Acute on chronic respiratory failure with hypoxia (HCC) POA: Yes  Active Problems:    Type 2 diabetes mellitus without complication, without long-term current use of insulin (HCC) POA: Yes    Anemia associated with acute blood loss POA: Yes    DVT  "(deep venous thrombosis) (HCC) POA: Yes    Sinus tachycardia POA: Yes  Resolved Problems:    Pneumonia due to COVID-19 virus POA: Yes    Bilateral pneumothoraces POA: Yes    Pneumatocele of lung POA: Yes    Thrombocytosis POA: Yes      FOLLOW UP  No follow-up provider specified.    MEDICATIONS ON DISCHARGE     Medication List      Start taking these medications      Instructions   Mucinex DM  MG Tb12  Replaces: Mucinex DM Maximum Strength  MG Tb12   Use as directed     rivaroxaban 20 MG Tabs tablet  Commonly known as: XARELTO   Take 1 Tablet by mouth with dinner.  Dose: 20 mg        Stop taking these medications    Mucinex DM Maximum Strength  MG Tb12  Replaced by: Mucinex DM  MG Tb12            Allergies  Allergies   Allergen Reactions   • Lidocaine Rash     Patient states \"it gives me blisters where it touches\"   • Pineapple Itching       DIET  Orders Placed This Encounter   Procedures   • Diet Order Diet: Consistent CHO (Diabetic)     Standing Status:   Standing     Number of Occurrences:   1     Order Specific Question:   Diet:     Answer:   Consistent CHO (Diabetic) [4]       ACTIVITY  As tolerated.  Weight bearing as tolerated    CONSULTATIONS  Thoracic surgery  Pulmonology    LABORATORY  Lab Results   Component Value Date    SODIUM 139 10/07/2021    POTASSIUM 4.0 10/07/2021    CHLORIDE 103 10/07/2021    CO2 27 10/07/2021    GLUCOSE 133 (H) 10/07/2021    BUN 11 10/07/2021    CREATININE 0.60 10/07/2021        Lab Results   Component Value Date    WBC 6.4 10/07/2021    HEMOGLOBIN 8.7 (L) 10/07/2021    HEMATOCRIT 27.3 (L) 10/07/2021    PLATELETCT 356 10/07/2021        Total time of the discharge process exceeds 40 minutes.  "

## 2021-10-08 NOTE — THERAPY
"Physical Therapy   Daily Treatment     Patient Name: Heriberto Boggs  Age:  31 y.o., Sex:  male  Medical Record #: [id number]  Today's Date: 10/8/2021     Precautions  Precautions: Fall Risk    Assessment    Pt progressing as expected and all PT goals met. Pt negotiated 1 FOS with no rail and SPV with VSS on 3L O2 via Oxymask and HR stable in 120's. Pt feels comfortable returning home given successful stair practice and ability to ambulate around nursing station. Recommend home with OP PT to address pulmonary deficits and decreased endurance. D/c pt from PT as all goals met. Please re-consult if change in functional status.     Plan    Discharge secondary to goals met.    DC Equipment Recommendations: None  Discharge Recommendations: Recommend outpatient physical therapy services to address higher level deficits      Subjective    \"I have 7 people at home to help me.\"     Objective     10/08/21 1449   Total Time Spent   Total Time Spent (Mins) 24   Charge Group   Charges  Yes   PT Gait Training 2   Precautions   Precautions Fall Risk   Vitals   O2 (LPM) 2.5   O2 Delivery Device Oxymask   Pain 0 - 10 Group   Therapist Pain Assessment Post Activity Pain Same as Prior to Activity;Nurse Notified;0   Cognition    Cognition / Consciousness WDL   Level of Consciousness Alert   Comments Pleasant and cooperative. Delayed at times during conversation.   Balance   Sitting Balance (Static) Normal   Sitting Balance (Dynamic) Good   Standing Balance (Static) Fair +   Standing Balance (Dynamic) Fair   Weight Shift Sitting Good   Weight Shift Standing Good   Skilled Intervention Verbal Cuing   Gait Analysis   Gait Level Of Assist Supervised   Assistive Device None   Distance (Feet) 120   # of Times Distance was Traveled 1   Deviation No deviation   # of Stairs Climbed 1  (FOS)   Level of Assist with Stairs Supervised   Weight Bearing Status no restrictions   Skilled Intervention Verbal Cuing;Compensatory Strategies   Comments no LOB, no " use of rails. Pt required standing rest breaks 2/2 fatigue, however, able to complete FOS. Pt feels comfortable returning home with full FOS to enter apartment given good demo. Use of RPE and talk test administered during ambulation/stairs.    Bed Mobility    Supine to Sit   (found sitting EOB)   Sit to Supine   (returned to sitting EOB)   Scooting Modified Independent   Functional Mobility   Sit to Stand Supervised   Bed, Chair, Wheelchair Transfer Supervised   Transfer Method Stand Step   Skilled Intervention Verbal Cuing  (cues for activity pacing and sxs awareness)   Comments VSS throughout ambulation/stairs   How much difficulty does the patient currently have...   Turning over in bed (including adjusting bedclothes, sheets and blankets)? 4   Sitting down on and standing up from a chair with arms (e.g., wheelchair, bedside commode, etc.) 4   Moving from lying on back to sitting on the side of the bed? 4   How much help from another person does the patient currently need...   Moving to and from a bed to a chair (including a wheelchair)? 4   Need to walk in a hospital room? 4   Climbing 3-5 steps with a railing? 3   6 clicks Mobility Score 23   Activity Tolerance   Sitting in Chair NT   Sitting Edge of Bed up pre/post   Standing 15 min   Short Term Goals    Short Term Goal # 1 pt will negotiate a FOS with R HR and SPV to facilitate home entry in 6 visits   Goal Outcome # 1 Goal met   Education Group   Education Provided Stair Training   Stair Training Patient Response Patient;Acceptance;Explanation;Action Demonstration   Anticipated Discharge Equipment and Recommendations   DC Equipment Recommendations None   Discharge Recommendations Recommend outpatient physical therapy services to address higher level deficits   Interdisciplinary Plan of Care Collaboration   IDT Collaboration with  Nursing   Patient Position at End of Therapy Edge of Bed;Call Light within Reach;Tray Table within Reach;Phone within Reach    Collaboration Comments RN updated   Session Information   Date / Session Number  10/8 2 (2/2, 10/11) D/c pt from PT

## 2021-10-09 LAB
BACTERIA BLD CULT: NORMAL
SIGNIFICANT IND 70042: NORMAL
SITE SITE: NORMAL
SOURCE SOURCE: NORMAL

## 2021-10-09 NOTE — PROGRESS NOTES
Patient discharged to home with grandmother and staff escort. IV discontinued. Prescriptions given to patient, verbalized understanding, consent signed and in chart. Discharge instructions given regarding home O2, anticoagulation, and follow up appointments.  Education provided, patient asked questions and verbalized understanding. Discharge paperwork signed and in chart. Patient is tolerating diet, stable when ambulating, and pain is well controlled. Home O2 brought to floor and d/c on O2. All belongings collected. No further questions or concerns at this time.

## 2021-10-09 NOTE — DISCHARGE PLANNING
Meds-to-Beds: Discharge prescription orders listed below delivered to patient's bedside. MICHAEL aPrikh notified. Patient counseled. Patient elected to have co-payment billed to patient account.     Reviewed signs and symptoms of bleeding and when to seek medical attention. Reviewed potential drug-drug interaction.     Current Outpatient Medications   Medication Sig Dispense Refill   • rivaroxaban (XARELTO) 20 MG Tab tablet Take 1 Tablet by mouth with dinner. 30 Tablet 0      Morena Hopson, PharmD

## 2024-01-03 NOTE — PROGRESS NOTES
Patients pain unmanaged with current PRNs. Discussed with MD. Changes made to PCA.    hair removal not indicated